# Patient Record
Sex: FEMALE | Race: WHITE | NOT HISPANIC OR LATINO | Employment: OTHER | URBAN - METROPOLITAN AREA
[De-identification: names, ages, dates, MRNs, and addresses within clinical notes are randomized per-mention and may not be internally consistent; named-entity substitution may affect disease eponyms.]

---

## 2017-03-01 ENCOUNTER — HOSPITAL ENCOUNTER (OUTPATIENT)
Dept: RADIOLOGY | Facility: HOSPITAL | Age: 82
Discharge: HOME | End: 2017-03-01
Attending: FAMILY MEDICINE
Payer: MEDICARE

## 2017-03-01 ENCOUNTER — OFFICE VISIT (OUTPATIENT)
Dept: FAMILY MEDICINE | Facility: CLINIC | Age: 82
End: 2017-03-01
Payer: MEDICARE

## 2017-03-01 VITALS
WEIGHT: 122.69 LBS | TEMPERATURE: 98 F | DIASTOLIC BLOOD PRESSURE: 63 MMHG | HEIGHT: 64 IN | BODY MASS INDEX: 20.95 KG/M2 | SYSTOLIC BLOOD PRESSURE: 116 MMHG | HEART RATE: 93 BPM

## 2017-03-01 DIAGNOSIS — M25.50 ARTHRALGIA, UNSPECIFIED JOINT: ICD-10-CM

## 2017-03-01 DIAGNOSIS — N18.30 DIABETES MELLITUS WITH STAGE 3 CHRONIC KIDNEY DISEASE: ICD-10-CM

## 2017-03-01 DIAGNOSIS — E11.22 DIABETES MELLITUS WITH STAGE 3 CHRONIC KIDNEY DISEASE: ICD-10-CM

## 2017-03-01 DIAGNOSIS — I08.0 MITRAL VALVE STENOSIS AND AORTIC VALVE STENOSIS: ICD-10-CM

## 2017-03-01 DIAGNOSIS — R30.0 DYSURIA: ICD-10-CM

## 2017-03-01 DIAGNOSIS — M25.50 ARTHRALGIA, UNSPECIFIED JOINT: Primary | ICD-10-CM

## 2017-03-01 DIAGNOSIS — M48.061 LUMBAR STENOSIS: ICD-10-CM

## 2017-03-01 DIAGNOSIS — R06.09 DOE (DYSPNEA ON EXERTION): ICD-10-CM

## 2017-03-01 DIAGNOSIS — F43.21 ADJUSTMENT DISORDER WITH DEPRESSED MOOD: ICD-10-CM

## 2017-03-01 DIAGNOSIS — R07.9 CHEST PAIN, UNSPECIFIED TYPE: ICD-10-CM

## 2017-03-01 DIAGNOSIS — R53.83 FATIGUE, UNSPECIFIED TYPE: ICD-10-CM

## 2017-03-01 DIAGNOSIS — F03.90 DEMENTIA WITHOUT BEHAVIORAL DISTURBANCE, UNSPECIFIED DEMENTIA TYPE: ICD-10-CM

## 2017-03-01 PROCEDURE — 99999 PR PBB SHADOW E&M-EST. PATIENT-LVL V: CPT | Mod: PBBFAC,,, | Performed by: FAMILY MEDICINE

## 2017-03-01 PROCEDURE — 71020 XR CHEST PA AND LATERAL: CPT | Mod: TC,PO

## 2017-03-01 PROCEDURE — 71020 XR CHEST PA AND LATERAL: CPT | Mod: 26,,, | Performed by: RADIOLOGY

## 2017-03-01 PROCEDURE — 73521 X-RAY EXAM HIPS BI 2 VIEWS: CPT | Mod: 26,,, | Performed by: RADIOLOGY

## 2017-03-01 PROCEDURE — 99214 OFFICE O/P EST MOD 30 MIN: CPT | Mod: S$PBB,,, | Performed by: FAMILY MEDICINE

## 2017-03-01 PROCEDURE — 73521 X-RAY EXAM HIPS BI 2 VIEWS: CPT | Mod: TC,PO

## 2017-03-01 RX ORDER — ACETAMINOPHEN 325 MG/1
650 TABLET ORAL EVERY 6 HOURS PRN
Refills: 0 | COMMUNITY
Start: 2017-03-01 | End: 2017-05-31

## 2017-03-01 RX ORDER — SERTRALINE HYDROCHLORIDE 50 MG/1
TABLET, FILM COATED ORAL
Qty: 30 TABLET | Refills: 5 | Status: SHIPPED | OUTPATIENT
Start: 2017-03-01 | End: 2017-03-14

## 2017-03-01 NOTE — MR AVS SNAPSHOT
[facility]  [address] [facility] [address]  Phone: [phone]  Fax: [phone]                  Renee Gatica   3/1/2017 2:20 PM   Office Visit    Description:  Female : 1923   Provider:  Terry Ayers MD   Department:  [facility]           Reason for Visit     Annual Exam           Diagnoses this Visit        Comments    Arthralgia, unspecified joint    -  Primary     Fatigue, unspecified type         Mitral valve stenosis and aortic valve stenosis         Dementia without behavioral disturbance, unspecified dementia type         Diabetes mellitus with stage 3 chronic kidney disease         Adjustment disorder with depressed mood         Dysuria         Chest pain, unspecified type         MALAGON (dyspnea on exertion)         Lumbar stenosis                To Do List           Future Appointments        Provider Department Dept Phone    3/1/2017  3:45 PM [facility] XR1 [facility] [phone]    3/1/2017 4:00 PM [facility] XR1 [facility] [phone]    3/1/2017 4:20 PM [place]ATORY, [place] [facility] [phone]      Goals (5 Years of Data)     None       These Medications        Disp Refills Start End    sertraline (ZOLOFT) 50 MG tablet 30 tablet 5 3/1/2017     Take 1/2 po daily for 1 week, then take 1 daily    Pharmacy: [facility] [address] [place], [place] [address] Ph #: [phone]         PURCHASE these Medications (No prescription required)        Start End    acetaminophen (TYLENOL) 325 MG tablet 3/1/2017     Sig - Route: Take 2 tablets (650 mg total) by mouth every 6 (six) hours as needed for Pain. - Oral    Class: OTC      [facility] On Call     [facility] On Call Nurse Care Line -  Assistance  Registered nurses in the [facility] On Call Center provide clinical advisement, health education, appointment booking, and other advisory services.  Call for this  free service at [phone].             Medications           Message regarding Medications     Verify the changes and/or additions to your medication regime listed below are the same as discussed with your clinician today.  If any of these changes or additions are incorrect, please notify your healthcare provider.        START taking these NEW medications        Refills    sertraline (ZOLOFT) 50 MG tablet 5    Sig: Take 1/2 po daily for 1 week, then take 1 daily    Class: Normal    acetaminophen (TYLENOL) 325 MG tablet 0    Sig: Take 2 tablets (650 mg total) by mouth every 6 (six) hours as needed for Pain.    Class: OTC    Route: Oral      STOP taking these medications     fluticasone (FLONASE) 50 mcg/actuation nasal spray 1 spray by Each Nare route once daily.           Verify that the below list of medications is an accurate representation of the medications you are currently taking.  If none reported, the list may be blank. If incorrect, please contact your healthcare provider. Carry this list with you in case of emergency.           Current Medications     acetaminophen (TYLENOL) 325 MG tablet Take 2 tablets (650 mg total) by mouth every 6 (six) hours as needed for Pain.    sertraline (ZOLOFT) 50 MG tablet Take 1/2 po daily for 1 week, then take 1 daily           Clinical Reference Information           Your Vitals Were     BP                   116/63           Blood Pressure          Most Recent Value    BP  116/63      Allergies as of 3/1/2017     Ace Inhibitors    Sulfa (Sulfonamide Antibiotics)      Immunizations Administered on Date of Encounter - 3/1/2017     None      Orders Placed During Today's Visit      Normal Orders This Visit    Ambulatory referral to Cardiology     Ambulatory referral to Home Health     Urinalysis     Urine culture     Future Labs/Procedures Expected by Expires    C-reactive protein  3/1/2017 4/30/2018    CBC auto differential  3/1/2017 3/1/2018    Comprehensive metabolic  panel  3/1/2017 3/1/2018    Hemoglobin A1c  3/1/2017 (Approximate) 3/1/2018    Sedimentation rate, manual  3/1/2017 3/1/2018    TSH  3/1/2017 3/2/2018    X-Ray Chest PA And Lateral  3/1/2017 3/1/2018    X-Ray Hips Bilateral 2 View Incl AP Pelvis  3/1/2017 3/1/2018    Urine culture  As directed 3/1/2018      Language Assistance Services     ATTENTION: Language assistance services are available, free of charge. Please call [phone].      ATENCIÓN: Si habla español, tiene a anaya disposición servicios gratuitos de asistencia lingüística. Llame al [phone].     CHÚ Ý: N?u b?n nói Ti?ng Vi?t, có các d?ch v? h? tr? ngôn ng? mi?n phí dành cho b?n. G?i s? [phone].         [facility] complies with applicable Federal civil rights laws and does not discriminate on the basis of race, color, national origin, age, disability, or sex.

## 2017-03-02 DIAGNOSIS — M25.50 ARTHRALGIA, UNSPECIFIED JOINT: Primary | ICD-10-CM

## 2017-03-02 LAB
BACTERIA #/AREA URNS HPF: ABNORMAL /HPF
BILIRUB UR QL STRIP: NEGATIVE
CLARITY UR: CLEAR
COLOR UR: YELLOW
GLUCOSE UR QL STRIP: NEGATIVE
HGB UR QL STRIP: ABNORMAL
KETONES UR QL STRIP: NEGATIVE
LEUKOCYTE ESTERASE UR QL STRIP: ABNORMAL
MICROSCOPIC COMMENT: ABNORMAL
NITRITE UR QL STRIP: POSITIVE
PH UR STRIP: 6 [PH] (ref 5–8)
PROT UR QL STRIP: ABNORMAL
RBC #/AREA URNS HPF: 1 /HPF (ref 0–4)
SP GR UR STRIP: 1.01 (ref 1–1.03)
SQUAMOUS #/AREA URNS HPF: 2 /HPF
URN SPEC COLLECT METH UR: ABNORMAL
WBC #/AREA URNS HPF: 12 /HPF (ref 0–5)

## 2017-03-02 RX ORDER — PREDNISONE 20 MG/1
20 TABLET ORAL DAILY
Qty: 30 TABLET | Refills: 0 | Status: SHIPPED | OUTPATIENT
Start: 2017-03-02 | End: 2017-03-31 | Stop reason: SDUPTHER

## 2017-03-02 RX ORDER — PREDNISONE 20 MG/1
20 TABLET ORAL DAILY
COMMUNITY
End: 2017-03-02 | Stop reason: SDUPTHER

## 2017-03-02 NOTE — TELEPHONE ENCOUNTER
----- Message from Chandrika Camejo sent at 3/2/2017 12:12 PM CST -----  Contact: pepito butterfield//daughter  does pt still need to see cardiologist altho her chest xray came back fine...[phone]

## 2017-03-02 NOTE — PROGRESS NOTES
Patient presents with a complaint of pain in the upper and lower extremities. Seems to be especially at the shoulders knees and thighs. She's had a lot aching. She gets stiff in the morning. She's had some chest pains and some dyspnea on exertion. Followed by [facility]. Symptoms at least for a few months. Poor historian. Seen with her daughter today. She denies any swelling.  She denies any falls. She's having difficulty taking care of her self at home. Very limited mobility. She takes a lot of naps during the day. Possibly some dysuria. Daughter ill. Some depression. No SI/HI.    Past Medical History:  Past Medical History:   Diagnosis Date    Acquired complete AV block 2015    Overview:  Required pacemaker placement.     Chronic kidney disease (CKD), stage III (moderate) 2015    CKD (chronic kidney disease) stage 3, GFR 30-59 ml/min     Dementia 2015    Diabetes mellitus type II     Hypertension     Lumbar stenosis     Mitral valve stenosis and aortic valve stenosis 2015    Overview:  EF 55-60%  Last Assessment & Plan:  Mild. No plans for further workup     Multinodular goiter 2015    Overview:  Has had normal TSH in      Osteopenia 2015    Presence of cardiac pacemaker 2015    Overview:  Placed for complete heart block  Last Assessment & Plan:  The patient's pacemaker site has healed nicely. She's not having any cardiac symptoms continue routine pacemaker checks     Spondylosis of cervical spine 2015    Overview:  C4-C7      Past Surgical History:   Procedure Laterality Date    APPENDECTOMY      CARDIAC PACEMAKER PLACEMENT       SECTION      CHOLECYSTECTOMY      HYSTERECTOMY       Social History     Social History    Marital status: Single     Spouse name: N/A    Number of children: N/A    Years of education: N/A     Occupational History    Not on file.     Social History Main Topics    Smoking status: Never Smoker    Smokeless  "tobacco: Never Used    Alcohol use No    Drug use: No    Sexual activity: Not on file     Other Topics Concern    Not on file     Social History Narrative     Family History   Problem Relation Age of Onset    Diabetes Daughter     Heart disease Brother     Colon cancer Brother     Cancer Father      Review of patient's allergies indicates:   Allergen Reactions    Ace inhibitors     Sulfa (sulfonamide antibiotics)      Current Outpatient Prescriptions on File Prior to Visit   Medication Sig Dispense Refill    [DISCONTINUED] fluticasone (FLONASE) 50 mcg/actuation nasal spray 1 spray by Each Nare route once daily. 16 g 2     No current facility-administered medications on file prior to visit.          ROS:  GENERAL: No fever, chills.   CARDIOVASCULAR: as above  ABDOMEN:. Denies diarrhea, abdominal pain, hematemesis or blood in stool.  URINARY: No flank pain,or hematuria.      OBJECTIVE:     Vitals:    03/01/17 1442   BP: 116/63   Pulse: 93   Temp: 97.8 °F (36.6 °C)   TempSrc: Oral   Weight: 55.7 kg (122 lb 11 oz)   Height: 5' 4" (1.626 m)     Wt Readings from Last 3 Encounters:   03/01/17 55.7 kg (122 lb 11 oz)   05/30/16 58.2 kg (128 lb 4.9 oz)   07/14/15 60.6 kg (133 lb 9.6 oz)     APPEARANCE: Well nourished, well developed, in no acute distress.  In wheelchair  HEAD: Normocephalic.  Atraumatic.  No sinus tenderness.  EYES:   Right eye: Pupil reactive.  Conjunctiva clear.    Left eye: Pupil reactive.  Conjunctiva clear.     EOMI.    EARS: TM's intact. Light reflex normal. No retraction or perforation.    NOSE:  clear.  MOUTH & THROAT:  No pharyngeal erythema or exudate. No lesions.  NECK: Supple. No bruits.  No JVD.  No cervical lymphadenopathy.  No thyromegaly.    CHEST: Breath sounds clear bilaterally.  Normal respiratory effort  CARDIOVASCULAR: Normal rate.  Regular rhythm.  2/6 sys  murmurs.  No rub.  No gallops.  ABDOMEN: Bowel sounds normal.  Soft.  No tenderness.  No organomegaly.  PERIPHERAL " VASCULAR: No cyanosis.  No clubbing.  No edema.  NEUROLOGIC: No focal findings.  MENTAL STATUS: Alert.  Oriented x 3.  Dec ROm shoulders and hips. No swollen jts. No focal tenderness  Protective Sensation (w/ 10 gram monofilament):  Right: Intact  Left: Intact    Visual Inspection:  Normal -  Bilateral    Pedal Pulses:   Right: Present  Left: Present    Posterior tibialis:   Right:Present  Left: Present        Renee was seen today for annual exam.    Diagnoses and all orders for this visit:    Arthralgia, unspecified joint  -     CBC auto differential; Future  -     Sedimentation rate, manual; Future  -     C-reactive protein; Future  -     Cancel: X-Ray Hip 3 or 4 views Bilateral; Future  -     Ambulatory referral to Home Health  -     X-Ray Hips Bilateral 2 View Incl AP Pelvis; Future    Fatigue, unspecified type  -     TSH; Future  -     Ambulatory referral to Home Health    Mitral valve stenosis and aortic valve stenosis  -     Ambulatory referral to Home Health  -     Ambulatory referral to Cardiology    Dementia without behavioral disturbance, unspecified dementia type  -     Ambulatory referral to Home Health    Diabetes mellitus with stage 3 chronic kidney disease  -     Comprehensive metabolic panel; Future  -     Hemoglobin A1c; Future  -     Ambulatory referral to Home Health    Adjustment disorder with depressed mood  -     Ambulatory referral to Home Health    Dysuria  -     Urinalysis  -     Urine culture; Future  -     Ambulatory referral to Home Health  -     Urine culture    Chest pain, unspecified type  -     Ambulatory referral to Cardiology    MALAGON (dyspnea on exertion)  -     X-Ray Chest PA And Lateral; Future  -     Ambulatory referral to Cardiology    Lumbar stenosis    Other orders  -     sertraline (ZOLOFT) 50 MG tablet; Take 1/2 po daily for 1 week, then take 1 daily  -     acetaminophen (TYLENOL) 325 MG tablet; Take 2 tablets (650 mg total) by mouth every 6 (six) hours as needed for  Pain.      F/u 1 month.

## 2017-03-02 NOTE — TELEPHONE ENCOUNTER
Sugar test was good.  Inflammation tests were significantly elevated.  Kidney function decreased, but similar to previous checks.  White blood cell count elevated with mild anemia.  Apparently the white blood cell count has been elevated in the past as well.     Please get serum protein electrophoresis, serum free light chains, random urine protein electrophoresis.  Refer to hematology to review.     Start prednisone 20 mg daily.  Have her come back to see me 1 week after starting prednisone.  Please get the blood test drawn and urine sample prior to starting to take the prednisone

## 2017-03-03 ENCOUNTER — NURSE TRIAGE (OUTPATIENT)
Dept: ADMINISTRATIVE | Facility: CLINIC | Age: 82
End: 2017-03-03

## 2017-03-03 NOTE — TELEPHONE ENCOUNTER
"  Reason for Disposition   [1] SEVERE headache AND [2] sudden-onset (i.e., reaching maximum intensity within seconds)    Answer Assessment - Initial Assessment Questions  1. LOCATION: "Where does it hurt?"       "all over"  2. ONSET: "When did the headache start?" (Minutes, hours or days)       Within last hour  3. PATTERN: "Does the pain come and go, or has it been constant since it started?"      constant  4. SEVERITY: "How bad is the pain?" and "What does it keep you from doing?"  (e.g., Scale 1-10; mild, moderate, or severe)    - MILD (1-3): doesn't interfere with normal activities     - MODERATE (4-7): interferes with normal activities or awakens from sleep     - SEVERE (8-10): excruciating pain, unable to do any normal activities         Pt states "pretty bad"    6. CAUSE: "What do you think is causing the headache?"      Not sure    9. OTHER SYMPTOMS: "Do you have any other symptoms?" (fever, stiff neck, eye pain, sore throat, cold symptoms)      + diarrhea, + nausea and vomited x1/ denies fever/ unsure if photophobic  10. PREGNANCY: "Is there any chance you are pregnant?" "When was your last menstrual period?"        n/a    Protocols used: ST HEADACHE-A-AH  daughter states she had sudden onset of HA/ + nausea and small amount of emesis/ + diarrhea/ denies fever/ started a generic zoloft today about 5 hours ago--daughter not sure if symptoms related/ when asked to rate headache pain pt's reply was "pretty bad"    To ER for evaluation and tx    Jolie Mchugh RN  "

## 2017-03-04 LAB
BACTERIA UR CULT: NORMAL
BACTERIA UR CULT: NORMAL

## 2017-03-06 ENCOUNTER — TELEPHONE (OUTPATIENT)
Dept: FAMILY MEDICINE | Facility: CLINIC | Age: 82
End: 2017-03-06

## 2017-03-06 ENCOUNTER — LAB VISIT (OUTPATIENT)
Dept: LAB | Facility: HOSPITAL | Age: 82
End: 2017-03-06
Attending: FAMILY MEDICINE
Payer: MEDICARE

## 2017-03-06 DIAGNOSIS — M25.50 ARTHRALGIA, UNSPECIFIED JOINT: ICD-10-CM

## 2017-03-06 PROCEDURE — 84165 PROTEIN E-PHORESIS SERUM: CPT | Mod: 26,,, | Performed by: PATHOLOGY

## 2017-03-06 PROCEDURE — 36415 COLL VENOUS BLD VENIPUNCTURE: CPT | Mod: PO

## 2017-03-06 PROCEDURE — 83520 IMMUNOASSAY QUANT NOS NONAB: CPT

## 2017-03-06 PROCEDURE — 86334 IMMUNOFIX E-PHORESIS SERUM: CPT | Mod: 26,,, | Performed by: PATHOLOGY

## 2017-03-06 PROCEDURE — 86334 IMMUNOFIX E-PHORESIS SERUM: CPT

## 2017-03-06 PROCEDURE — 84165 PROTEIN E-PHORESIS SERUM: CPT

## 2017-03-06 NOTE — TELEPHONE ENCOUNTER
Per daughter, HH is evaluating patient as we speak.  Informed her to let us know, after evaluation, if they need anything from us.

## 2017-03-06 NOTE — TELEPHONE ENCOUNTER
----- Message from Obed Eduardo sent at 3/6/2017  9:50 AM CST -----  Contact: Yordan ( pt daughter )   Yordan ( pt daughter ) is requesting a call from nurse to discuss some health concern and questions regarding medication. Yordan ( pt daughter ) [place] and would to request a order for home health aid or a round the clock care.            Please call Yordan ( pt daughter ) back at [phone]

## 2017-03-07 LAB
ALBUMIN SERPL ELPH-MCNC: 3.02 G/DL
ALPHA1 GLOB SERPL ELPH-MCNC: 0.65 G/DL
ALPHA2 GLOB SERPL ELPH-MCNC: 1.03 G/DL
B-GLOBULIN SERPL ELPH-MCNC: 1.04 G/DL
GAMMA GLOB SERPL ELPH-MCNC: 1.85 G/DL
KAPPA LC SER QL IA: 13.11 MG/DL
KAPPA LC/LAMBDA SER IA: 1.45
LAMBDA LC SER QL IA: 9.04 MG/DL
PATHOLOGIST INTERPRETATION SPE: NORMAL
PROT SERPL-MCNC: 7.6 G/DL

## 2017-03-07 NOTE — TELEPHONE ENCOUNTER
----- Message from Jaclyn Rodriguez sent at 3/7/2017  8:18 AM CST -----  Contact: logan/[facility]  Would like [facility] clinic notes faxed to [phone]. Please call back at [phone]. Thanks//cdb

## 2017-03-08 ENCOUNTER — TELEPHONE (OUTPATIENT)
Dept: FAMILY MEDICINE | Facility: CLINIC | Age: 82
End: 2017-03-08

## 2017-03-08 LAB
INTERPRETATION SERPL IFE-IMP: NORMAL
PATHOLOGIST INTERPRETATION IFE: NORMAL

## 2017-03-08 NOTE — TELEPHONE ENCOUNTER
I have been unable to reach this patient. Left message for patient to call back to discuss home health.

## 2017-03-08 NOTE — TELEPHONE ENCOUNTER
Patient's daughter states that her mom cannot use arms and is pain constantly and says that tylenol is not helping. Patient was last seen in office on 03/01/2017and is asking what else can be given. Please advise.

## 2017-03-08 NOTE — TELEPHONE ENCOUNTER
----- Message from Ebonie Tapia sent at 3/8/2017  3:38 PM CST -----  Contact: patient's daughter  Patient's daughter is trying to have her mother moved to an assisted living home. Dr. Ayers suggested this as a solution for the patient.    Dr. Ayers is out of the clinic until next Tuesday and I am unable to book an appointment on his schedule until two week from today's date. Her daughter only has four days left until she returns home.    Her daughter is asking if there is another physician that can help in filling out the needed paperwork and examinations that need to take place in order to have her moved to an assisted living home asa.    Please call her daughter, whom is listed on her involvement in care at [phone] to let her know if this is something that Dr. Ayers must do or if another pcp can complete this process for the patient. Her daughter handling this is Yamile Gonzalez.

## 2017-03-08 NOTE — TELEPHONE ENCOUNTER
Due to her kidney disease, her options are limited. If she cannot move her arms at all, she may need to report to the ER.

## 2017-03-09 NOTE — TELEPHONE ENCOUNTER
Is this something you can assist this patient with or does her PCP have to complete the paperwork, please advise.

## 2017-03-09 NOTE — TELEPHONE ENCOUNTER
Steffany informed tried numerous times, number busy.  Dtdacia Ovalle informed she has to be seen by Dr Ayers, OV scheduled Tuesday, dtr aware.

## 2017-03-09 NOTE — TELEPHONE ENCOUNTER
----- Message from Laurel Mackenzie sent at 3/9/2017 11:48 AM CST -----  Contact: [facility]  [facility] calling regarding family there wanting to admit the pt so they need medication  List,recent chest x-ray and diagnosis list faxed over,[phone] fax number....[phone]

## 2017-03-14 ENCOUNTER — OFFICE VISIT (OUTPATIENT)
Dept: FAMILY MEDICINE | Facility: CLINIC | Age: 82
End: 2017-03-14
Payer: MEDICARE

## 2017-03-14 VITALS
BODY MASS INDEX: 21.15 KG/M2 | WEIGHT: 123.88 LBS | SYSTOLIC BLOOD PRESSURE: 116 MMHG | HEIGHT: 64 IN | TEMPERATURE: 98 F | DIASTOLIC BLOOD PRESSURE: 63 MMHG | HEART RATE: 95 BPM

## 2017-03-14 DIAGNOSIS — D72.829 LEUKOCYTOSIS, UNSPECIFIED TYPE: ICD-10-CM

## 2017-03-14 DIAGNOSIS — M47.812 OSTEOARTHRITIS OF CERVICAL SPINE, UNSPECIFIED SPINAL OSTEOARTHRITIS COMPLICATION STATUS: ICD-10-CM

## 2017-03-14 DIAGNOSIS — E11.22 DIABETES MELLITUS WITH STAGE 3 CHRONIC KIDNEY DISEASE: ICD-10-CM

## 2017-03-14 DIAGNOSIS — M48.061 LUMBAR STENOSIS: ICD-10-CM

## 2017-03-14 DIAGNOSIS — M35.3 PMR (POLYMYALGIA RHEUMATICA): Primary | ICD-10-CM

## 2017-03-14 DIAGNOSIS — M85.80 OSTEOPENIA: ICD-10-CM

## 2017-03-14 DIAGNOSIS — E04.2 MULTINODULAR GOITER: ICD-10-CM

## 2017-03-14 DIAGNOSIS — I08.0 MITRAL VALVE STENOSIS AND AORTIC VALVE STENOSIS: ICD-10-CM

## 2017-03-14 DIAGNOSIS — F03.90 DEMENTIA WITHOUT BEHAVIORAL DISTURBANCE, UNSPECIFIED DEMENTIA TYPE: ICD-10-CM

## 2017-03-14 DIAGNOSIS — N18.30 DIABETES MELLITUS WITH STAGE 3 CHRONIC KIDNEY DISEASE: ICD-10-CM

## 2017-03-14 DIAGNOSIS — I15.2 HYPERTENSION ASSOCIATED WITH DIABETES: ICD-10-CM

## 2017-03-14 DIAGNOSIS — Z95.0 PRESENCE OF CARDIAC PACEMAKER: ICD-10-CM

## 2017-03-14 DIAGNOSIS — E11.59 HYPERTENSION ASSOCIATED WITH DIABETES: ICD-10-CM

## 2017-03-14 PROCEDURE — 99214 OFFICE O/P EST MOD 30 MIN: CPT | Mod: S$PBB,,, | Performed by: FAMILY MEDICINE

## 2017-03-14 PROCEDURE — 99213 OFFICE O/P EST LOW 20 MIN: CPT | Mod: PBBFAC,PO | Performed by: FAMILY MEDICINE

## 2017-03-14 PROCEDURE — 99999 PR PBB SHADOW E&M-EST. PATIENT-LVL III: CPT | Mod: PBBFAC,,, | Performed by: FAMILY MEDICINE

## 2017-03-14 RX ORDER — METOPROLOL SUCCINATE 25 MG/1
25 TABLET, EXTENDED RELEASE ORAL
COMMUNITY
Start: 2017-03-06 | End: 2017-03-14

## 2017-03-14 RX ORDER — HYDROCODONE BITARTRATE AND ACETAMINOPHEN 5; 325 MG/1; MG/1
1 TABLET ORAL
COMMUNITY
Start: 2017-03-08 | End: 2017-03-28

## 2017-03-14 NOTE — MR AVS SNAPSHOT
[facility]  [address] [address] [facility] [address]  Phone: [phone]  Fax: [phone]                  Renee Gatica   3/14/2017 3:00 PM   Office Visit    Description:  Female : 1923   Provider:  Terry Ayers MD   Department:  [facility]           Reason for Visit     Arm Pain     nursing home admit           Diagnoses this Visit        Comments    PMR (polymyalgia rheumatica)    -  Primary     Leukocytosis, unspecified type         Dementia without behavioral disturbance, unspecified dementia type         Hypertension associated with diabetes         Mitral valve stenosis and aortic valve stenosis         Diabetes mellitus with stage 3 chronic kidney disease         Multinodular goiter         Osteopenia         Osteoarthritis of cervical spine, unspecified spinal osteoarthritis complication status         Presence of cardiac pacemaker         Lumbar stenosis                To Do List           Future Appointments        Provider Department Dept Phone    3/28/2017 11:15 AM LABORATORY, [place] [facility] [phone]    2017 9:20 AM Daniel Snow MD [facility] - Hematology Oncology [phone]    2017 10:45 AM [facility], [place] [facility] [phone]      Goals (5 Years of Data)     None      [facility]     [facility] Nurse Care Line -  Assistance  Registered nurses in the [facility] provide clinical advisement, health education, appointment booking, and other advisory services.  Call for this free service at [phone].             Medications           Message regarding Medications     Verify the changes and/or additions to your medication regime listed below are the same as discussed with your clinician today.  If any of these changes or additions are incorrect, please notify your healthcare provider.        STOP taking these medications     sertraline (ZOLOFT) 50 MG  "tablet Take 1/2 po daily for 1 week, then take 1 daily    metoprolol succinate (TOPROL-XL) 25 MG 24 hr tablet Take 25 mg by mouth.           Verify that the below list of medications is an accurate representation of the medications you are currently taking.  If none reported, the list may be blank. If incorrect, please contact your healthcare provider. Carry this list with you in case of emergency.           Current Medications     acetaminophen (TYLENOL) 325 MG tablet Take 2 tablets (650 mg total) by mouth every 6 (six) hours as needed for Pain.    hydrocodone-acetaminophen 5-325mg (NORCO) 5-325 mg per tablet Take 1 tablet by mouth.    predniSONE (DELTASONE) 20 MG tablet Take 1 tablet (20 mg total) by mouth once daily.           Clinical Reference Information           Your Vitals Were     BP Pulse Temp Height Weight BMI    116/63 95 97.6 °F (36.4 °C) 5' 4" (1.626 m) 56.2 kg (123 lb 14.4 oz) 21.27 kg/m2      Blood Pressure          Most Recent Value    BP  116/63      Allergies as of 3/14/2017     Ace Inhibitors    Sulfa (Sulfonamide Antibiotics)      Immunizations Administered on Date of Encounter - 3/14/2017     None      Orders Placed During Today's Visit      Normal Orders This Visit    Ambulatory referral to Hematology / Oncology     Recurring Lab Work Interval Expires    C-reactive protein   3/14/2018    Sedimentation rate, manual   3/14/2018      Language Assistance Services     ATTENTION: Language assistance services are available, free of charge. Please call [phone].      ATENCIÓN: Si habla español, tiene a anaya disposición servicios gratuitos de asistencia lingüística. Llame al [phone].     YUE Ý: N?u b?n nói Ti?ng Vi?t, có các d?ch v? h? tr? ngôn ng? mi?n phí dành cho b?n. G?i s? [phone].         [facility] complies with applicable Federal civil rights laws and does not discriminate on the basis of race, color, national origin, age, disability, or sex.        "

## 2017-03-14 NOTE — PROGRESS NOTES
Patient presents for follow-up laboratory and multiple arthralgias particularly shoulders knees and thighs.  See previous history of present illness as below.  She was found to have significant elevation CRP and sedimentation rate consistent with probable polymyalgia rheumatica and has been started on 20 mg daily prednisone.  Has noted significant improvement with this with regards to being able to get up out of the chair and use her walker.  She did see the cardiologist regarding chest pains with no further evaluation recommended.  They did discuss starting beta blocker which she has not done as yet.  The chest symptoms essentially resolved with starting the prednisone.  She did have findings consistent with some anemia with elevated WBC.  This apparently is persistent.  We did do a serum protein electrophoresis and free light chains.  She had some elevated proteins but no monoclonal gammopathy noted.  The daughter that has been assisting her temporarily from [place] he's having to go back to work in [place].  Patient has been living with another daughter who is having significant health issues of her own currently and is not able to take care of her.  She is having home health.  They're considering going into nursing home as patient has not been able to physically care for herself with regards to fixing meals.  Difficulty getting up out of bed to go to the bathroom, difficulty with her vision and hearing.  Also general medical decline from dementia.    3/1/2017  Patient presents with a complaint of pain in the upper and lower extremities. Seems to be especially at the shoulders knees and thighs. She's had a lot aching. She gets stiff in the morning. She's had some chest pains and some dyspnea on exertion. Followed by [facility]. Symptoms at least for a few months. Poor historian. Seen with her daughter today. She denies any swelling.  She denies any falls. She's having difficulty taking care of  her self at home. Very limited mobility. She takes a lot of naps during the day. Possibly some dysuria. Daughter ill. Some depression. No SI/HI.    Past Medical History:  Past Medical History:   Diagnosis Date    Acquired complete AV block 2015    Overview:  Required pacemaker placement.     Chronic kidney disease (CKD), stage III (moderate) 2015    CKD (chronic kidney disease) stage 3, GFR 30-59 ml/min     Dementia 2015    Diabetes mellitus type II     Hypertension     Lumbar stenosis     Mitral valve stenosis and aortic valve stenosis 2015    Overview:  EF 55-60%  Last Assessment & Plan:  Mild. No plans for further workup     Multinodular goiter 2015    Overview:  Has had normal TSH in      Osteopenia 2015    Presence of cardiac pacemaker 2015    Overview:  Placed for complete heart block  Last Assessment & Plan:  The patient's pacemaker site has healed nicely. She's not having any cardiac symptoms continue routine pacemaker checks     Spondylosis of cervical spine 2015    Overview:  C4-C7      Past Surgical History:   Procedure Laterality Date    APPENDECTOMY      CARDIAC PACEMAKER PLACEMENT       SECTION      CHOLECYSTECTOMY      HYSTERECTOMY       Social History     Social History    Marital status: Single     Spouse name: N/A    Number of children: N/A    Years of education: N/A     Occupational History    Not on file.     Social History Main Topics    Smoking status: Never Smoker    Smokeless tobacco: Never Used    Alcohol use No    Drug use: No    Sexual activity: Not on file     Other Topics Concern    Not on file     Social History Narrative     Family History   Problem Relation Age of Onset    Diabetes Daughter     Heart disease Brother     Colon cancer Brother     Cancer Father      Review of patient's allergies indicates:   Allergen Reactions    Ace inhibitors     Sulfa (sulfonamide antibiotics)      Current Outpatient  "Prescriptions on File Prior to Visit   Medication Sig Dispense Refill    acetaminophen (TYLENOL) 325 MG tablet Take 2 tablets (650 mg total) by mouth every 6 (six) hours as needed for Pain.  0    predniSONE (DELTASONE) 20 MG tablet Take 1 tablet (20 mg total) by mouth once daily. 30 tablet 0    [DISCONTINUED] sertraline (ZOLOFT) 50 MG tablet Take 1/2 po daily for 1 week, then take 1 daily 30 tablet 5     No current facility-administered medications on file prior to visit.          ROS:  GENERAL: No fever, chills.   CARDIOVASCULAR: No chest pain  ABDOMEN:. Denies diarrhea, abdominal pain, hematemesis or blood in stool.  URINARY: No flank pain,or hematuria.      OBJECTIVE:     Vitals:    03/14/17 1505   BP: 116/63   Pulse: 95   Temp: 97.6 °F (36.4 °C)   Weight: 56.2 kg (123 lb 14.4 oz)   Height: 5' 4" (1.626 m)     Wt Readings from Last 3 Encounters:   03/14/17 56.2 kg (123 lb 14.4 oz)   03/01/17 55.7 kg (122 lb 11 oz)   05/30/16 58.2 kg (128 lb 4.9 oz)     APPEARANCE: Well nourished, well developed, in no acute distress.  Using her walker today instead of wheelchair as she was last visit   HEAD: Normocephalic.  Atraumatic.  No sinus tenderness.  EYES:   Right eye: Pupil reactive.  Conjunctiva clear.    Left eye: Pupil reactive.  Conjunctiva clear.     EOMI.    EARS: TM's intact. Light reflex normal. No retraction or perforation.    NOSE:  clear.  MOUTH & THROAT:  No pharyngeal erythema or exudate. No lesions.  NECK: Supple. No bruits.  No JVD.  No cervical lymphadenopathy.  No thyromegaly.    CHEST: Breath sounds clear bilaterally.  Normal respiratory effort  CARDIOVASCULAR: Normal rate.  Regular rhythm.  2-3/6 sys  murmurs.  No rub.  No gallops.  ABDOMEN: Bowel sounds normal.  Soft.  No tenderness.  No organomegaly.  PERIPHERAL VASCULAR: No cyanosis.  No clubbing.  No edema.  NEUROLOGIC: No focal findings.  MENTAL STATUS: Alert.  Oriented x 3.  Dec ROm shoulders and hips. No swollen jts. No focal tenderness  She " is able to get up off the exam table and use her walker to get into a chair without assistance today      Renee was seen today for arm pain and nursing home admit.    Diagnoses and all orders for this visit:    PMR (polymyalgia rheumatica)  -     C-reactive protein; Standing  -     Sedimentation rate, manual; Standing    Leukocytosis, unspecified type  -     Ambulatory referral to Hematology / Oncology    Dementia without behavioral disturbance, unspecified dementia type    Hypertension associated with diabetes    Mitral valve stenosis and aortic valve stenosis    Diabetes mellitus with stage 3 chronic kidney disease    Multinodular goiter    Osteopenia    Osteoarthritis of cervical spine, unspecified spinal osteoarthritis complication status    Presence of cardiac pacemaker    Lumbar stenosis      Repeat sedimentation rate and C-reactive protein in 2 weeks.  Consider decreasing prednisone if improved.  Recheck hemoglobin A1c in 3 months.  We'll have her see hematology regarding persistent elevated white blood cell count.  Nursing home paperwork completed if she decides to move to the nursing home

## 2017-03-17 ENCOUNTER — TELEPHONE (OUTPATIENT)
Dept: FAMILY MEDICINE | Facility: CLINIC | Age: 82
End: 2017-03-17

## 2017-03-17 NOTE — TELEPHONE ENCOUNTER
I want her to get the CRP and sedimentation rate in about 2 weeks and if this is looking okay then we will start tapering the prednisone slowly at that time.  She is to stay on it for now.

## 2017-03-17 NOTE — TELEPHONE ENCOUNTER
Has about 3 weeks left on the Prednisone, daughter said that you  wanted to taper her off the prednisone.

## 2017-03-17 NOTE — TELEPHONE ENCOUNTER
Adv daughter and key home health to have labs done in 2 weeks.     Adv daughter of additional information regarding tapering.

## 2017-03-17 NOTE — TELEPHONE ENCOUNTER
----- Message from Ebonie Douglas sent at 3/17/2017 10:08 AM CDT -----  Contact: Patient daughter, Yamile  Has a question about her mothers Prednisone dosage, please call her back at [phone]. Thank you

## 2017-03-28 RX ORDER — BUTALBITAL, ACETAMINOPHEN AND CAFFEINE 50; 325; 40 MG/1; MG/1; MG/1
1 TABLET ORAL EVERY 6 HOURS PRN
Qty: 20 TABLET | Refills: 0 | Status: SHIPPED | OUTPATIENT
Start: 2017-03-28 | End: 2017-03-29 | Stop reason: SDUPTHER

## 2017-03-28 NOTE — TELEPHONE ENCOUNTER
Will end the prednisone on Friday, asking if you would call in the dose that you want her start.. C/o of headaches, caretaker gave her a 1/2 of hydrocodone that she received from the hospital. Asking what she should take for the headaches.

## 2017-03-28 NOTE — TELEPHONE ENCOUNTER
Headaches are on forehead and sinus area, unable to come for OV, has not heard from HH, will look into and contact her back, Dr Ayers informed.

## 2017-03-28 NOTE — TELEPHONE ENCOUNTER
----- Message from Nova Pang sent at 3/28/2017 12:37 PM CDT -----  Contact: Keptqlmq-PTRVRCEO-[phone]   Would like to consult with the nurse about change in a medication dosage and changes in patient. Pt would like a Rx called in for headaches if possible would like  Rx medication Hydrocodone.   Please call back @ [phone].  Thanks-[facility]

## 2017-03-28 NOTE — TELEPHONE ENCOUNTER
Needs CRP, sedimentation rate and follow-up appointment in order for me to determine whether we are changing the dose on the prednisone.      Where is the headache on her head?

## 2017-03-29 ENCOUNTER — TELEPHONE (OUTPATIENT)
Dept: FAMILY MEDICINE | Facility: CLINIC | Age: 82
End: 2017-03-29

## 2017-03-29 RX ORDER — BUTALBITAL, ACETAMINOPHEN AND CAFFEINE 50; 325; 40 MG/1; MG/1; MG/1
1 TABLET ORAL EVERY 6 HOURS PRN
Qty: 20 TABLET | Refills: 0 | Status: SHIPPED | OUTPATIENT
Start: 2017-03-29 | End: 2017-04-08

## 2017-03-29 NOTE — TELEPHONE ENCOUNTER
HA medication sent to pharmacy this am.   orders, and all pertinent information, faxed to [facility] at [phone]

## 2017-03-31 RX ORDER — PREDNISONE 20 MG/1
20 TABLET ORAL DAILY
Qty: 30 TABLET | Refills: 0 | Status: SHIPPED | OUTPATIENT
Start: 2017-03-31 | End: 2017-05-12 | Stop reason: SDUPTHER

## 2017-03-31 NOTE — TELEPHONE ENCOUNTER
Did not get a refill request on the prednisone before today.  Looks like I got prescription request for something else few days ago.  Prednisone Prescription was sent today .  We need to get the follow-up blood work that was ordered in order to determine appropriate dose adjustments

## 2017-03-31 NOTE — TELEPHONE ENCOUNTER
----- Message from Alberta Rodriguez sent at 3/31/2017  3:52 PM CDT -----  Contact: Yamlie Gonzalez/daughter  Yamile called and stated she requested a refill of Prednisone for her mother 2 days ago. And they have went to the pharmacy and it isn't there.     [facility] [address] [place] [address] - [address] [facility]  [address] [facility] [address]  Phone: [phone] Fax: [phone]    Please call her at [phone].    Thanks,  Alberta

## 2017-04-04 ENCOUNTER — TELEPHONE (OUTPATIENT)
Dept: FAMILY MEDICINE | Facility: CLINIC | Age: 82
End: 2017-04-04

## 2017-04-04 ENCOUNTER — LAB VISIT (OUTPATIENT)
Dept: LAB | Facility: HOSPITAL | Age: 82
End: 2017-04-04
Attending: FAMILY MEDICINE
Payer: MEDICARE

## 2017-04-04 DIAGNOSIS — M35.3 POLYMYALGIA RHEUMATICA: Primary | ICD-10-CM

## 2017-04-04 LAB — CRP SERPL-MCNC: 1.4 MG/L

## 2017-04-04 PROCEDURE — 86140 C-REACTIVE PROTEIN: CPT

## 2017-04-04 NOTE — TELEPHONE ENCOUNTER
Patient admitted to [facility], per Gisselle, they harvey the blood work yesterday, but only a CMP and sed rate and it was brought to [facility] to run.  Informed CRP needed and lab should be brought to [facility], in the future, to be run.

## 2017-04-04 NOTE — TELEPHONE ENCOUNTER
Inflammation test looks a lot better, but not normal.  Recommend continue with current prednisone 20 mg daily.  Recheck CRP, sedimentation rate in 4 weeks.

## 2017-05-04 ENCOUNTER — TELEPHONE (OUTPATIENT)
Dept: ADMINISTRATIVE | Facility: HOSPITAL | Age: 82
End: 2017-05-04

## 2017-05-04 DIAGNOSIS — R70.0 ELEVATED SEDIMENTATION RATE: Primary | ICD-10-CM

## 2017-05-04 NOTE — TELEPHONE ENCOUNTER
Her recent sedimentation rate is increasing again.  Still better than when she started off with the prednisone.  Recommend continue prednisone for now.  Please refer her to [facility] for further evaluation.  Also please have the home health recheck another sedimentation rate and CRP in 1 month.  Have home health bring the labwork through the [facility] so that we can keep her results together, otherwise need to consider using a different home health.

## 2017-05-12 ENCOUNTER — NURSE TRIAGE (OUTPATIENT)
Dept: ADMINISTRATIVE | Facility: CLINIC | Age: 82
End: 2017-05-12

## 2017-05-12 RX ORDER — PREDNISONE 20 MG/1
TABLET ORAL
Qty: 30 TABLET | Refills: 0 | Status: SHIPPED | OUTPATIENT
Start: 2017-05-12 | End: 2017-05-31

## 2017-05-12 NOTE — TELEPHONE ENCOUNTER
"  Reason for Disposition   Caller has medication question, adult has minor symptoms, caller declines triage, and triager answers question    Protocols used: ST MEDICATION QUESTION CALL-A-AH  daughter calls about refills on meds/ as I check med card and review with caller it is discovered that pt is not on meds that caller is req to be refilled/ caller is not w/ pt & is not the caregiver/ sibling takes care of pt    At end of conversation caller states pt has been "threatening to kill herself".    Advised caller that if pt is threatening to kill herself she needs to go to ER for evaluation/ suicide threats cannot be taken lightly    Caller states she will let her sibling know  Call back for any other concerns or questions    Jolie Mchugh RN  "

## 2017-05-15 ENCOUNTER — TELEPHONE (OUTPATIENT)
Dept: FAMILY MEDICINE | Facility: CLINIC | Age: 82
End: 2017-05-15

## 2017-05-15 NOTE — TELEPHONE ENCOUNTER
Daughter Yamile advised of previous call, she said patient does not have any appointments scheduled as of now.  Informed to call Dr Carson, phone number given, to schedule appt and if unable to get in, call for appt here, verbalized understanding.

## 2017-05-15 NOTE — TELEPHONE ENCOUNTER
Spoke with Candi, with [facility], asked to have someone go to home today to check what medications are being given to patient, and check on follow up  appointment with Dr Carson, or have her schedule here.

## 2017-05-15 NOTE — TELEPHONE ENCOUNTER
----- Message from Estrella Swanson sent at 5/15/2017  8:56 AM CDT -----  Pt daughter(Yamile) at [phone]//states she is returning your call//please call again//thanks//dallas

## 2017-05-15 NOTE — TELEPHONE ENCOUNTER
"Reason for Disposition   Caller has medication question, adult has minor symptoms, caller declines triage, and triager answers question    Protocols used: ST MEDICATION QUESTION CALL-A-AH  daughter calls about refills on meds/ as I check med card and review with caller it is discovered that pt is not on meds that caller is req to be refilled/ caller is not w/ pt & is not the caregiver/ sibling takes care of pt     At end of conversation caller states pt has been "threatening to kill herself".   Advised caller that if pt is threatening to kill herself she needs to go to ER for evaluation/ suicide threats cannot be taken lightly     Caller states she will let her sibling know  Call back for any other concerns or questions     Jolie Mchugh RN    Spoke to daughter Yamile, states caregiver found old zoloft and metoprolol and stated giving it to patient.  She got diarrhea and vomiting from it, they were informed to discard medications.  Yamile reports they said she is better now.  Dr Ayers informed, needs to make sure she has appointment with Dr Carson set up,or have her come here.  Called home, Patient answered and kept saying help me, please help me, I need to go to the bathroom, she reports no one is there to help her.  I called daughter Melisa, they state that "we never left her", they do not know if she has an appointment with Dr Carson, that Yamile takes care of all her appointments.  Called Yamile back, left message on voice mail to return call.  "

## 2017-05-17 RX ORDER — BUTALBITAL, ACETAMINOPHEN AND CAFFEINE 50; 325; 40 MG/1; MG/1; MG/1
1 TABLET ORAL EVERY 6 HOURS PRN
Qty: 20 TABLET | Refills: 0 | Status: SHIPPED | OUTPATIENT
Start: 2017-05-17 | End: 2017-05-31

## 2017-05-17 RX ORDER — BUTALBITAL, ACETAMINOPHEN AND CAFFEINE 50; 325; 40 MG/1; MG/1; MG/1
1 TABLET ORAL EVERY 6 HOURS PRN
COMMUNITY
End: 2017-05-17 | Stop reason: SDUPTHER

## 2017-05-31 ENCOUNTER — OFFICE VISIT (OUTPATIENT)
Dept: FAMILY MEDICINE | Facility: CLINIC | Age: 82
End: 2017-05-31
Payer: MEDICARE

## 2017-05-31 ENCOUNTER — LAB VISIT (OUTPATIENT)
Dept: LAB | Facility: HOSPITAL | Age: 82
End: 2017-05-31
Attending: FAMILY MEDICINE
Payer: MEDICARE

## 2017-05-31 VITALS
HEART RATE: 108 BPM | SYSTOLIC BLOOD PRESSURE: 110 MMHG | HEIGHT: 64 IN | BODY MASS INDEX: 20.32 KG/M2 | WEIGHT: 119.06 LBS | DIASTOLIC BLOOD PRESSURE: 71 MMHG

## 2017-05-31 DIAGNOSIS — N18.30 DIABETES MELLITUS WITH STAGE 3 CHRONIC KIDNEY DISEASE: ICD-10-CM

## 2017-05-31 DIAGNOSIS — E11.59 HYPERTENSION ASSOCIATED WITH DIABETES: ICD-10-CM

## 2017-05-31 DIAGNOSIS — I15.2 HYPERTENSION ASSOCIATED WITH DIABETES: ICD-10-CM

## 2017-05-31 DIAGNOSIS — M35.3 PMR (POLYMYALGIA RHEUMATICA): Primary | ICD-10-CM

## 2017-05-31 DIAGNOSIS — E11.22 DIABETES MELLITUS WITH STAGE 3 CHRONIC KIDNEY DISEASE: ICD-10-CM

## 2017-05-31 DIAGNOSIS — M35.3 PMR (POLYMYALGIA RHEUMATICA): ICD-10-CM

## 2017-05-31 DIAGNOSIS — I08.0 MITRAL VALVE STENOSIS AND AORTIC VALVE STENOSIS: ICD-10-CM

## 2017-05-31 LAB
ALBUMIN SERPL BCP-MCNC: 3.3 G/DL
ALP SERPL-CCNC: 46 U/L
ALT SERPL W/O P-5'-P-CCNC: 39 U/L
ANION GAP SERPL CALC-SCNC: 10 MMOL/L
AST SERPL-CCNC: 19 U/L
BASOPHILS # BLD AUTO: ABNORMAL K/UL
BASOPHILS NFR BLD: 0 %
BILIRUB SERPL-MCNC: 0.5 MG/DL
BUN SERPL-MCNC: 33 MG/DL
CALCIUM SERPL-MCNC: 9.8 MG/DL
CHLORIDE SERPL-SCNC: 105 MMOL/L
CO2 SERPL-SCNC: 26 MMOL/L
CREAT SERPL-MCNC: 1.1 MG/DL
DIFFERENTIAL METHOD: ABNORMAL
EOSINOPHIL # BLD AUTO: ABNORMAL K/UL
EOSINOPHIL NFR BLD: 0 %
ERYTHROCYTE [DISTWIDTH] IN BLOOD BY AUTOMATED COUNT: 16.9 %
EST. GFR  (AFRICAN AMERICAN): 49.7 ML/MIN/1.73 M^2
EST. GFR  (NON AFRICAN AMERICAN): 43.1 ML/MIN/1.73 M^2
GLUCOSE SERPL-MCNC: 147 MG/DL
HCT VFR BLD AUTO: 36.5 %
HGB BLD-MCNC: 12 G/DL
LYMPHOCYTES # BLD AUTO: ABNORMAL K/UL
LYMPHOCYTES NFR BLD: 5 %
MCH RBC QN AUTO: 32.7 PG
MCHC RBC AUTO-ENTMCNC: 32.9 %
MCV RBC AUTO: 100 FL
MONOCYTES # BLD AUTO: ABNORMAL K/UL
MONOCYTES NFR BLD: 6 %
NEUTROPHILS NFR BLD: 88 %
NEUTS BAND NFR BLD MANUAL: 1 %
PLATELET # BLD AUTO: 349 K/UL
PMV BLD AUTO: 11.2 FL
POTASSIUM SERPL-SCNC: 4.1 MMOL/L
PROT SERPL-MCNC: 6.9 G/DL
RBC # BLD AUTO: 3.67 M/UL
SODIUM SERPL-SCNC: 141 MMOL/L
WBC # BLD AUTO: 25.82 K/UL

## 2017-05-31 PROCEDURE — 99214 OFFICE O/P EST MOD 30 MIN: CPT | Mod: S$PBB,,, | Performed by: FAMILY MEDICINE

## 2017-05-31 PROCEDURE — 99999 PR PBB SHADOW E&M-EST. PATIENT-LVL II: CPT | Mod: PBBFAC,,, | Performed by: FAMILY MEDICINE

## 2017-05-31 PROCEDURE — 36415 COLL VENOUS BLD VENIPUNCTURE: CPT | Mod: PO

## 2017-05-31 PROCEDURE — 85007 BL SMEAR W/DIFF WBC COUNT: CPT

## 2017-05-31 PROCEDURE — 85027 COMPLETE CBC AUTOMATED: CPT

## 2017-05-31 PROCEDURE — 80053 COMPREHEN METABOLIC PANEL: CPT

## 2017-05-31 RX ORDER — METOPROLOL SUCCINATE 25 MG/1
25 TABLET, EXTENDED RELEASE ORAL DAILY
Refills: 3 | COMMUNITY
Start: 2017-04-25 | End: 2017-07-12

## 2017-05-31 NOTE — PROGRESS NOTES
Patient presents for follow-up of polymyalgia rheumatica.  She stopped taking the prednisone on her own a few weeks ago.  Caregiver states that it was making her nervous and giving her headache.  She's felt well since then.  She's not having a lot of shoulder or hip pain.  She is able to get up and down from a chair without significant difficulty.  She denies any current headache or jaw claudication.  She denies chest pain or shortness of breath.  She does have chronic kidney disease associated with type 2 diabetes which has been stable.  She did have some anemia associated with the PMR asymptomatic.    Past Medical History:  Past Medical History:   Diagnosis Date    Acquired complete AV block 2015    Overview:  Required pacemaker placement.     Chronic kidney disease (CKD), stage III (moderate) 2015    CKD (chronic kidney disease) stage 3, GFR 30-59 ml/min     Dementia 2015    Diabetes mellitus type II     Hypertension     Lumbar stenosis     Mitral valve stenosis and aortic valve stenosis 2015    Overview:  EF 55-60%  Last Assessment & Plan:  Mild. No plans for further workup     Multinodular goiter 2015    Overview:  Has had normal TSH in      Osteopenia 2015    Presence of cardiac pacemaker 2015    Overview:  Placed for complete heart block  Last Assessment & Plan:  The patient's pacemaker site has healed nicely. She's not having any cardiac symptoms continue routine pacemaker checks     Spondylosis of cervical spine 2015    Overview:  C4-C7      Past Surgical History:   Procedure Laterality Date    APPENDECTOMY      CARDIAC PACEMAKER PLACEMENT       SECTION      CHOLECYSTECTOMY      HYSTERECTOMY       Social History     Social History    Marital status: Single     Spouse name: N/A    Number of children: N/A    Years of education: N/A     Occupational History    Not on file.     Social History Main Topics    Smoking status: Never Smoker  "   Smokeless tobacco: Never Used    Alcohol use No    Drug use: No    Sexual activity: Not on file     Other Topics Concern    Not on file     Social History Narrative    No narrative on file     Family History   Problem Relation Age of Onset    Diabetes Daughter     Heart disease Brother     Colon cancer Brother     Cancer Father      Review of patient's allergies indicates:   Allergen Reactions    Ace inhibitors     Sulfa (sulfonamide antibiotics)      Current Outpatient Prescriptions on File Prior to Visit   Medication Sig Dispense Refill    [DISCONTINUED] acetaminophen (TYLENOL) 325 MG tablet Take 2 tablets (650 mg total) by mouth every 6 (six) hours as needed for Pain.  0    [DISCONTINUED] butalbital-acetaminophen-caffeine -40 mg (FIORICET, ESGIC) -40 mg per tablet Take 1 tablet by mouth every 6 (six) hours as needed for Pain. 20 tablet 0    [DISCONTINUED] predniSONE (DELTASONE) 20 MG tablet TAKE 1 TABLET(20 MG) BY MOUTH EVERY DAY 30 tablet 0     No current facility-administered medications on file prior to visit.            ROS:  GENERAL: No fever, chills,  or significant weight changes.   CARDIOVASCULAR: Denies chest pain, PND, orthopnea .  ABDOMEN: Appetite fine. Denies diarrhea, abdominal pain, hematemesis or blood in stool.  URINARY: No flank pain, dysuria or hematuria.      OBJECTIVE:     Vitals:    05/31/17 1302   BP: 110/71   Pulse: 108   Weight: 54 kg (119 lb 0.8 oz)   Height: 5' 4" (1.626 m)     Wt Readings from Last 3 Encounters:   05/31/17 54 kg (119 lb 0.8 oz)   03/14/17 56.2 kg (123 lb 14.4 oz)   03/01/17 55.7 kg (122 lb 11 oz)     APPEARANCE: Well nourished, well developed, in no acute distress.    HEAD: Normocephalic.  Atraumatic.  No sinus tenderness.  EYES:   Right eye: Pupil reactive.  Conjunctiva clear.    Left eye: Pupil reactive.  Conjunctiva clear.    . EOMI.    EARS: TM's intact. Light reflex normal. No retraction or perforation.    NOSE:  clear.  MOUTH & " THROAT:  No pharyngeal erythema or exudate. No lesions.  NECK: Supple. No JVD.  No cervical lymphadenopathy.  No thyromegaly.    CHEST: Breath sounds clear bilaterally.  Normal respiratory effort  CARDIOVASCULAR: Normal rate.  Regular rhythm.  2/6 systolic murmurs.  No rub.  No gallops.  ABDOMEN: Bowel sounds normal.  Soft.  No tenderness.  No organomegaly.  PERIPHERAL VASCULAR: No cyanosis.  No clubbing.  No edema.  NEUROLOGIC: No focal findings.  She is able to get up from a chair and walk without significant difficulty she is able to climb on the exam table without difficulty  Both shoulders with good range of motion without tenderness  MENTAL STATUS: Alert.  Oriented x 3.    Diagnoses and all orders for this visit:    PMR (polymyalgia rheumatica)  -     CBC auto differential; Future    Diabetes mellitus with stage 3 chronic kidney disease  -     Comprehensive metabolic panel; Future    Hypertension associated with diabetes    Mitral valve stenosis and aortic valve stenosis      Patient discontinued prednisone on her own.  Advised if she gets recurrent symptoms to follow-up again with this.  Otherwise plan to hold off on the prednisone unless recurrent symptoms.  Check CMP CBC hemoglobin A1c at the end of August.

## 2017-06-02 ENCOUNTER — LAB VISIT (OUTPATIENT)
Dept: LAB | Facility: HOSPITAL | Age: 82
End: 2017-06-02
Attending: FAMILY MEDICINE
Payer: MEDICARE

## 2017-06-02 DIAGNOSIS — M35.3 POLYMYALGIA RHEUMATICA: Primary | ICD-10-CM

## 2017-06-02 LAB
CRP SERPL-MCNC: 7.1 MG/L
ERYTHROCYTE [SEDIMENTATION RATE] IN BLOOD BY WESTERGREN METHOD: 23 MM/HR

## 2017-06-02 PROCEDURE — 86140 C-REACTIVE PROTEIN: CPT

## 2017-06-02 PROCEDURE — 85651 RBC SED RATE NONAUTOMATED: CPT | Mod: PO

## 2017-06-14 ENCOUNTER — TELEPHONE (OUTPATIENT)
Dept: HEMATOLOGY/ONCOLOGY | Facility: CLINIC | Age: 82
End: 2017-06-14

## 2017-06-14 ENCOUNTER — OFFICE VISIT (OUTPATIENT)
Dept: HEMATOLOGY/ONCOLOGY | Facility: CLINIC | Age: 82
End: 2017-06-14
Payer: MEDICARE

## 2017-06-14 VITALS
BODY MASS INDEX: 20.63 KG/M2 | TEMPERATURE: 98 F | HEART RATE: 106 BPM | WEIGHT: 120.81 LBS | SYSTOLIC BLOOD PRESSURE: 115 MMHG | OXYGEN SATURATION: 95 % | HEIGHT: 64 IN | DIASTOLIC BLOOD PRESSURE: 67 MMHG

## 2017-06-14 DIAGNOSIS — D72.829 LEUKOCYTOSIS, UNSPECIFIED TYPE: Primary | ICD-10-CM

## 2017-06-14 PROCEDURE — 1159F MED LIST DOCD IN RCRD: CPT | Mod: ,,, | Performed by: INTERNAL MEDICINE

## 2017-06-14 PROCEDURE — 99205 OFFICE O/P NEW HI 60 MIN: CPT | Mod: S$PBB,,, | Performed by: INTERNAL MEDICINE

## 2017-06-14 PROCEDURE — 99999 PR PBB SHADOW E&M-EST. PATIENT-LVL III: CPT | Mod: PBBFAC,,, | Performed by: INTERNAL MEDICINE

## 2017-06-14 PROCEDURE — 99213 OFFICE O/P EST LOW 20 MIN: CPT | Mod: PBBFAC,PO | Performed by: INTERNAL MEDICINE

## 2017-06-14 PROCEDURE — 1126F AMNT PAIN NOTED NONE PRSNT: CPT | Mod: ,,, | Performed by: INTERNAL MEDICINE

## 2017-06-14 RX ORDER — PREDNISONE 20 MG/1
TABLET ORAL
Qty: 30 TABLET | Refills: 0 | OUTPATIENT
Start: 2017-06-14

## 2017-06-14 NOTE — PROGRESS NOTES
OUTPATIENT    MAIN COMPLAINT:  We are asked by Dr. Terry Ayers to evaluate this 89-year-old    lady in regards to her leukocytosis.    HISTORY OF PRESENT ILLNESS:  This is a 89-year-old lady who has dementia.  She   comes today accompanied by her caretaker.  Her insight into the patient's   medical issues is limited.    The patient had a CBC done on 2017 that was reported as showing a   hemoglobin of 12 with an MCV of 100.  White blood cell count was 25,820 with 88%   granulocytes.  The patient has been on prednisone for at least four months for   a diagnosis of polymyalgia rheumatica.  It is noticed by going through the chart   and accessing the care elsewhere section of [employer] that she has had elevated   white cell count off and on between 10,000 and 15,000 for the last several   years.    ALLERGIES:  ACE inhibitors and sulfa.    MEDICATIONS:  See MedCard.    PAST MEDICAL HISTORY:  1.  Chronic kidney disease.  2.  Dementia.  3.  Diabetes mellitus type 2.  4.  Hypertension.  5.  Polymyalgia rheumatica.  6.  Mitral valve stenosis.  7.  Multinodular goiter.  8.  Osteopenia.    PAST SURGICAL HISTORY:  1.  Appendectomy, cardiac pacemaker placement.  2.   section.  3.  Cholecystectomy.  4.  Hysterectomy.    SOCIAL HISTORY:  She lives by herself with a care sitter who looks after her   during the day and the son-in-law during the evening.    She does not smoke or drink.    FAMILY DISEASES:  Unable to obtain.    REVIEW OF SYSTEMS:  Limited because of the lack of knowledge of the patient's   caretaker.  I told that she has a history of polymyalgia rheumatica, generalized   pain and dementia.    PHYSICAL EXAMINATION:  GENERAL:  The patient keeps sitting down with her eyes closed saying that she   fell back.  She did not interact with me.  VITAL SIGNS:  Blood pressure 115/67, pulse 106, temperature 97.6, oxygen   saturation 95%, weight 54.8 kilos.  EYES:  No jaundice or paleness.  OROPHARYNX:  No  ulcers.  No exudates.  ENDOCRINE:  No palpable thyroid.  LYMPHATICS:  No cervical or supraclavicular adenopathy.  NECK:  No jugular venous distension or masses.  LUNGS:  Clear and well ventilated without rales, wheezes, or rhonchi.  CARDIOVASCULAR:  The heart was rhythmic without murmurs or gallops.  ABDOMEN:  Soft.  No obvious hepatosplenomegaly, guarding or rebound.  EXTREMITIES:  No edema.  Good dorsalis pedis and posterior tibialis pulses.  SKIN:  No rashes or ecchymosis.  NEUROLOGIC:  Coordination, strength and gait were normal.  PSYCHIATRIC:  Unable to assess.  The patient has a diagnosis of dementia.    DISCUSSION:  This patient has moderate leukocytosis with 80% granulocytes.    Prednisone can cause elevation of the white cell count and at the expense of the   neutrophil.  However, she has had white cell count elevations before she was   placed on prednisone.  She will have blood drawn today for flow cytometry,   BCR-ABL gene rearrangement and a repeat CBC.  We will discuss the case with Dr. Terry Ayers.  It is noted that the patient has a sacral decubitus.  I will   have the caretaker discuss with her home health agency the local care for the   wound.  In the meantime, they will apply Desitin.  See me in two weeks.                RDF/PN  dd: 06/14/2017 15:26:43 (CDT)  td: 06/14/2017 16:10:52 (CDT)  Doc ID   #[id number]  Job ID #[id number]    CC:

## 2017-06-14 NOTE — PROGRESS NOTES
"OUTPATIENT    MAIN COMPLAINT:  We are asked by Dr. Terry Ayers to evaluate this 89-year-old    lady in regards to her leukocytosis.    HISTORY OF PRESENT ILLNESS:  This is a 89-year-old lady who has dementia.  She   comes today accompanied by her caretaker. The caretaker;s insight into the patient's   medical issues is limited.    The patient had a CBC done on 2017 that was reported as showing a   hemoglobin of 12 with an MCV of 100.  White blood cell count was 25,820 with 88%   granulocytes.  The patient has been on prednisone for at least four months for   a diagnosis of polymyalgia rheumatica.  It is noticed by going through the chart   and accessing the "Care Elsewhere" section of EPIC that she has had elevated   white cell count off and on between 10,000 and 15,000 for the last several   years.    ALLERGIES:  ACE inhibitors and sulfa.    MEDICATIONS:  See MedCard.    PAST MEDICAL HISTORY:  1.  Chronic kidney disease.  2.  Dementia.  3.  Diabetes mellitus type 2.  4.  Hypertension.  5.  Polymyalgia rheumatica.  6.  Mitral valve stenosis.  7.  Multinodular goiter.  8.  Osteopenia.    PAST SURGICAL HISTORY:  1.  Appendectomy, cardiac pacemaker placement.  2.   section.  3.  Cholecystectomy.  4.  Hysterectomy.    SOCIAL HISTORY:  She lives by herself with a care sitter who looks after her   during the day and the son-in-law during the evening.    She does not smoke or drink.    FAMILY DISEASES:  Unable to obtain.    REVIEW OF SYSTEMS:  Limited because of the lack of knowledge of the patient's   caretaker.  I told that she has a history of polymyalgia rheumatica, generalized   pain and dementia.    PHYSICAL EXAMINATION:  GENERAL:  The patient keeps sitting down with her eyes closed saying that she   fell back.  She did not interact with me.  VITAL SIGNS:  Blood pressure 115/67, pulse 106, temperature 97.6, oxygen   saturation 95%, weight 54.8 kilos.  EYES:  No jaundice or " paleness.  OROPHARYNX:  No ulcers.  No exudates.  ENDOCRINE:  No palpable thyroid.  LYMPHATICS:  No cervical or supraclavicular adenopathy.  NECK:  No jugular venous distension or masses.  LUNGS:  Clear and well ventilated without rales, wheezes, or rhonchi.  CARDIOVASCULAR:  The heart was rhythmic without murmurs or gallops.  ABDOMEN:  Soft.  No obvious hepatosplenomegaly, guarding or rebound.  EXTREMITIES:  No edema.  Good dorsalis pedis and posterior tibialis pulses.  SKIN:  No rashes or ecchymosis.  NEUROLOGIC:  Coordination, strength and gait were normal.  PSYCHIATRIC:  Unable to assess.  The patient has a diagnosis of dementia.    DISCUSSION:  This patient has moderate leukocytosis with 80% granulocytes.    Prednisone can cause elevation of the white cell count and at the expense of the   neutrophils.  However, she has had white cell count elevations before she was   placed on prednisone.  She will have blood drawn today for flow cytometry,   BCR-ABL gene rearrangement and a repeat CBC.  We will discuss the case with Dr. Terry Ayers.  It is noted that the patient has a sacral decubitus.  I will   have the caretaker discuss with her home health agency the local care for the   wound.  In the meantime, they will apply Desitin.  See me in two weeks.                DANIAL/TAMIKO  dd: 06/14/2017 15:26:43 (CDT)  td: 06/14/2017 16:10:52 (CDT)  Doc ID   #[id number]  Job ID #[id number]    CC:

## 2017-06-14 NOTE — TELEPHONE ENCOUNTER
----- Message from Jaclyn Reynolds sent at 6/13/2017  2:16 PM CDT -----  Contact: pt daughter Delilah  Patient daughter states she received a call from the office wanting to know if the patient can come in at 1 pm. States yes patient can come in at that time. Please adv/call [phone].//thanks. cw

## 2017-06-14 NOTE — LETTER
June 14, 2017      Terry Ayers MD  [address] [facility] [address]           [place] [facility] H[facility]ology Oncology  [address] [facility] [address]  Phone: [phone]          Patient: Renee Gatica   MR Number: [id number]   YOB: 1923   Date of Visit: 6/14/2017       Dear Dr. Terry Ayers:    Thank you for referring Renee Gatica to me for evaluation. Attached you will find relevant portions of my assessment and plan of care.    If you have questions, please do not hesitate to call me. I look forward to following Renee Gatica along with you.    Sincerely,    Daniel Snow MD    Enclosure  CC:  No Recipients    If you would like to receive this communication electronically, please contact [email or web address] or [phone] to request more information on [facility] access.    For providers and/or their staff who would like to refer a patient to [facility], please contact us through our one-stop-shop provider referral line, [facility], at [phone].    If you feel you have received this communication in error or would no longer like to receive these types of communications, please e-mail [email or web address]

## 2017-06-14 NOTE — TELEPHONE ENCOUNTER
Patient see by Dr Snow today, diarrhea and vomiting while in office. Dr Snow requested Dr Ayers to see her but they did not want to wait and left.  Per Dr Ayers, find out if patient on steroid still.  Called number, no answer

## 2017-06-15 ENCOUNTER — TELEPHONE (OUTPATIENT)
Dept: HEMATOLOGY/ONCOLOGY | Facility: CLINIC | Age: 82
End: 2017-06-15

## 2017-06-15 ENCOUNTER — TELEPHONE (OUTPATIENT)
Dept: FAMILY MEDICINE | Facility: CLINIC | Age: 82
End: 2017-06-15

## 2017-06-15 DIAGNOSIS — M35.3 POLYMYALGIA RHEUMATICA: Primary | ICD-10-CM

## 2017-06-15 RX ORDER — PREDNISONE 5 MG/1
15 TABLET ORAL DAILY
Qty: 90 TABLET | Refills: 0 | Status: SHIPPED | OUTPATIENT
Start: 2017-06-15 | End: 2017-07-25 | Stop reason: SDUPTHER

## 2017-06-15 NOTE — TELEPHONE ENCOUNTER
"Called home number, patient answered but did not know about her medications, she states "I don't know where I am or who I am"  Asked if her family or caregivers were there and she replied" the boy is here but doesn't know anything" and she hung up.  I called daughter Yamile, she does not know if patient is on the steroids still but will contact the caregivers to find out and will call back.  "

## 2017-06-15 NOTE — TELEPHONE ENCOUNTER
----- Message from Claudia Penn sent at 6/15/2017  2:33 PM CDT -----  Contact: Yamile Gonzalez/daughter  Pt lost her rx for Prednisone. Pt last took in on Monday, 06/13/17. Pt needs to have a new rx sent to..    [email or web address] [address] [place] [address] [facility] [address]  [facility]  [place] [address]  Phone: [phone] Fax: [phone]     Yamile can be reached at [phone].

## 2017-06-16 NOTE — TELEPHONE ENCOUNTER
Daughter pepito informed, had questions about patient's medications, advised to have caregiver bring in bottles of any mediations patient is taking so we can review and verify as only metoprolol and steroids are showing on her profile.

## 2017-06-19 ENCOUNTER — TELEPHONE (OUTPATIENT)
Dept: HEMATOLOGY/ONCOLOGY | Facility: CLINIC | Age: 82
End: 2017-06-19

## 2017-06-19 NOTE — TELEPHONE ENCOUNTER
----- Message from Daniel Snow MD sent at 6/17/2017 10:00 AM CDT -----  Please call transcriptions and have them type my dictation from 6/14/2017  Thanks  DR SNOW

## 2017-06-26 ENCOUNTER — TELEPHONE (OUTPATIENT)
Dept: HEMATOLOGY/ONCOLOGY | Facility: CLINIC | Age: 82
End: 2017-06-26

## 2017-06-26 DIAGNOSIS — D72.829 LEUKOCYTOSIS, UNSPECIFIED TYPE: Primary | ICD-10-CM

## 2017-06-26 NOTE — TELEPHONE ENCOUNTER
----- Message from Maggie Hills sent at 6/26/2017  1:51 PM CDT -----  Contact: pepito/daughter [phone]  States that she is returning call please call back at [phone]//thank you acc

## 2017-06-26 NOTE — TELEPHONE ENCOUNTER
----- Message from Chandrika Camejo sent at 6/26/2017 12:33 PM CDT -----  Contact: ms beyer-daughter  does dr need lab results at 6/28 appt...[phone]

## 2017-06-28 ENCOUNTER — LAB VISIT (OUTPATIENT)
Dept: LAB | Facility: HOSPITAL | Age: 82
End: 2017-06-28
Attending: INTERNAL MEDICINE
Payer: MEDICARE

## 2017-06-28 DIAGNOSIS — D72.829 LEUKOCYTOSIS, UNSPECIFIED TYPE: ICD-10-CM

## 2017-06-28 LAB
ANISOCYTOSIS BLD QL SMEAR: SLIGHT
BASOPHILS # BLD AUTO: 0.03 K/UL
BASOPHILS NFR BLD: 0.2 %
DACRYOCYTES BLD QL SMEAR: ABNORMAL
DIFFERENTIAL METHOD: ABNORMAL
EOSINOPHIL # BLD AUTO: 0 K/UL
EOSINOPHIL NFR BLD: 0 %
ERYTHROCYTE [DISTWIDTH] IN BLOOD BY AUTOMATED COUNT: 16.3 %
HCT VFR BLD AUTO: 33.2 %
HGB BLD-MCNC: 11 G/DL
LYMPHOCYTES # BLD AUTO: 0.8 K/UL
LYMPHOCYTES NFR BLD: 4.1 %
MCH RBC QN AUTO: 32.9 PG
MCHC RBC AUTO-ENTMCNC: 33.1 %
MCV RBC AUTO: 99 FL
MONOCYTES # BLD AUTO: 0.9 K/UL
MONOCYTES NFR BLD: 4.8 %
NEUTROPHILS # BLD AUTO: 17.4 K/UL
NEUTROPHILS NFR BLD: 90.9 %
PLATELET # BLD AUTO: 332 K/UL
PLATELET BLD QL SMEAR: ABNORMAL
PMV BLD AUTO: 9.8 FL
POIKILOCYTOSIS BLD QL SMEAR: SLIGHT
RBC # BLD AUTO: 3.34 M/UL
WBC # BLD AUTO: 19.17 K/UL

## 2017-06-28 PROCEDURE — 85025 COMPLETE CBC W/AUTO DIFF WBC: CPT | Mod: PO

## 2017-06-28 PROCEDURE — 36415 COLL VENOUS BLD VENIPUNCTURE: CPT | Mod: PO

## 2017-06-28 PROCEDURE — 88275 CYTOGENETICS 100-300: CPT

## 2017-07-12 ENCOUNTER — OFFICE VISIT (OUTPATIENT)
Dept: HEMATOLOGY/ONCOLOGY | Facility: CLINIC | Age: 82
End: 2017-07-12
Payer: MEDICARE

## 2017-07-12 ENCOUNTER — LAB VISIT (OUTPATIENT)
Dept: LAB | Facility: HOSPITAL | Age: 82
End: 2017-07-12
Attending: INTERNAL MEDICINE
Payer: MEDICARE

## 2017-07-12 VITALS
BODY MASS INDEX: 20.87 KG/M2 | DIASTOLIC BLOOD PRESSURE: 67 MMHG | SYSTOLIC BLOOD PRESSURE: 126 MMHG | HEIGHT: 64 IN | HEART RATE: 104 BPM | OXYGEN SATURATION: 98 % | WEIGHT: 122.25 LBS | TEMPERATURE: 98 F

## 2017-07-12 DIAGNOSIS — D72.829 LEUKOCYTOSIS, UNSPECIFIED TYPE: Primary | ICD-10-CM

## 2017-07-12 DIAGNOSIS — D72.829 LEUKOCYTOSIS, UNSPECIFIED TYPE: ICD-10-CM

## 2017-07-12 LAB
BASOPHILS # BLD AUTO: 0.06 K/UL
BASOPHILS NFR BLD: 0.3 %
CLINICAL CYTOGENETICIST REVIEW: NORMAL
DACRYOCYTES BLD QL SMEAR: ABNORMAL
DIFFERENTIAL METHOD: ABNORMAL
EOSINOPHIL # BLD AUTO: 0.1 K/UL
EOSINOPHIL NFR BLD: 0.6 %
ERYTHROCYTE [DISTWIDTH] IN BLOOD BY AUTOMATED COUNT: 16.1 %
HCT VFR BLD AUTO: 33.2 %
HGB BLD-MCNC: 11 G/DL
LYMPHOCYTES # BLD AUTO: 3.5 K/UL
LYMPHOCYTES NFR BLD: 15.7 %
MBCR DISCLAIMER: NORMAL
MBCR REASON FOR REFERRAL: NORMAL
MBCR RELEASED BY: NORMAL
MBCR RESULT SUMMARY: NORMAL
MBCR RESULT TABLE: NORMAL
MBCR SPECIMEN: NORMAL
MCH RBC QN AUTO: 33.3 PG
MCHC RBC AUTO-ENTMCNC: 33.1 %
MCV RBC AUTO: 101 FL
MONOCYTES # BLD AUTO: 2 K/UL
MONOCYTES NFR BLD: 8.6 %
NEUTROPHILS # BLD AUTO: 16.9 K/UL
NEUTROPHILS NFR BLD: 74.8 %
PLATELET # BLD AUTO: 325 K/UL
PLATELET BLD QL SMEAR: ABNORMAL
PMV BLD AUTO: 10.3 FL
RBC # BLD AUTO: 3.3 M/UL
REF LAB TEST METHOD: NORMAL
SERVICE CMNT-IMP: NORMAL
SPECIMEN SOURCE: NORMAL
T(9;22)(ABL1,BCR) BLD/T FISH: NORMAL
WBC # BLD AUTO: 22.61 K/UL

## 2017-07-12 PROCEDURE — 99999 PR PBB SHADOW E&M-EST. PATIENT-LVL III: CPT | Mod: PBBFAC,,, | Performed by: INTERNAL MEDICINE

## 2017-07-12 PROCEDURE — 36415 COLL VENOUS BLD VENIPUNCTURE: CPT | Mod: PO

## 2017-07-12 PROCEDURE — 85025 COMPLETE CBC W/AUTO DIFF WBC: CPT | Mod: PO

## 2017-07-12 PROCEDURE — 99213 OFFICE O/P EST LOW 20 MIN: CPT | Mod: S$PBB,,, | Performed by: INTERNAL MEDICINE

## 2017-07-12 PROCEDURE — 1126F AMNT PAIN NOTED NONE PRSNT: CPT | Mod: ,,, | Performed by: INTERNAL MEDICINE

## 2017-07-12 PROCEDURE — 1159F MED LIST DOCD IN RCRD: CPT | Mod: ,,, | Performed by: INTERNAL MEDICINE

## 2017-07-12 RX ORDER — SERTRALINE HYDROCHLORIDE 50 MG/1
TABLET, FILM COATED ORAL
COMMUNITY
Start: 2017-04-25 | End: 2017-08-16

## 2017-07-12 NOTE — PROGRESS NOTES
Subjective:       Patient ID: Renee Gatica is a 89 y.o. female.    Chief Complaint: No chief complaint on file.    HPI This is a 89-year-old lady who has dementia.  She   comes today accompanied by her caretaker.  Her insight into the patient's   medical issues is limited.     The patient had a CBC done on 2017 that was reported as showing a   hemoglobin of 12 with an MCV of 100.  White blood cell count was 25,820 with 88%   granulocytes.  The patient has been on prednisone for at least four months for   a diagnosis of polymyalgia rheumatica.  It is noticed by going through the chart   and accessing the care elsewhere section of [facility] that she has had elevated   white cell count off and on between 10,000 and 15,000 for the last several   years.     She had a BRC-ABL gene test done 2 weeks ago and the report is still not in EPIC  She comes in accompanied by he caretaker which does not know what medications she is on.  ALLERGIES:  ACE inhibitors and sulfa.     MEDICATIONS:  See MedCard.     PAST MEDICAL HISTORY:  1.  Chronic kidney disease.  2.  Dementia.  3.  Diabetes mellitus type 2.  4.  Hypertension.  5.  Polymyalgia rheumatica.  6.  Mitral valve stenosis.  7.  Multinodular goiter.  8.  Osteopenia.     PAST SURGICAL HISTORY:  1.  Appendectomy, cardiac pacemaker placement.  2.   section.  3.  Cholecystectomy.  4.  Hysterectomy.     SOCIAL HISTORY:  She lives by herself with a care sitter who looks after her   during the day and the son-in-law during the evening.     She does not smoke or drink.     FAMILY DISEASES:  Unable to obtain.     REVIEW OF SYSTEMS:  Limited because of the lack of knowledge of the patient's   caretaker.  I told that she has a history of polymyalgia rheumatica, generalized   pain and dementia.  Review of Systems   Constitutional: Negative.    HENT: Negative.    Eyes: Negative.    Respiratory: Negative.  Negative for cough and wheezing.    Cardiovascular: Negative.  Negative for  chest pain.   Gastrointestinal: Negative.    Genitourinary: Negative.    Neurological: Negative.    Psychiatric/Behavioral: Negative.        Objective:      Physical Exam   Constitutional: She is oriented to person, place, and time. She appears well-developed. No distress.   HENT:   Head: Normocephalic.   Right Ear: Tympanic membrane and ear canal normal.   Left Ear: Tympanic membrane and ear canal normal.   Nose: Right sinus exhibits no maxillary sinus tenderness and no frontal sinus tenderness. Left sinus exhibits no maxillary sinus tenderness and no frontal sinus tenderness.   Mouth/Throat: Mucous membranes are normal.   Teeth normal.  Gums normal.   Eyes: Lids are normal. Pupils are equal, round, and reactive to light.   Neck: Normal carotid pulses, no hepatojugular reflux and no JVD present. Carotid bruit is not present. No tracheal deviation present. No thyroid mass and no thyromegaly present.   Cardiovascular: Normal rate, regular rhythm, S1 normal and S2 normal.  Exam reveals no gallop and no friction rub.    No murmur heard.  Carotid exam normal   Pulmonary/Chest: Effort normal and breath sounds normal. No accessory muscle usage. No respiratory distress. She has no wheezes. She has no rales. She exhibits no tenderness.   Abdominal: Soft. Normal appearance. She exhibits no distension and no mass. There is no splenomegaly or hepatomegaly. There is no tenderness. There is no rebound and no guarding.   Musculoskeletal: Normal range of motion. She exhibits no edema or tenderness.        Right hand: Normal.        Left hand: Normal.       Lymphadenopathy:     She has no cervical adenopathy.     She has no axillary adenopathy.        Right: No inguinal and no supraclavicular adenopathy present.        Left: No inguinal and no supraclavicular adenopathy present.   Neurological: She is alert and oriented to person, place, and time. She has normal strength. No cranial nerve deficit. Coordination normal.   Skin: Skin  is warm and dry. No rash noted. She is not diaphoretic. No cyanosis or erythema. No pallor. Nails show no clubbing.   Psychiatric: She has a normal mood and affect. Her behavior is normal. Judgment and thought content normal.     ..lstvitals      Assessment:       1. Leukocytosis, unspecified type        Plan:       She will have a cbc today. Will try to get the report of the BCR-ABL gene mutation test. Caretaker to bring her in 4 weeks with her home medicines

## 2017-07-25 RX ORDER — PREDNISONE 5 MG/1
TABLET ORAL
Qty: 90 TABLET | Refills: 0 | Status: SHIPPED | OUTPATIENT
Start: 2017-07-25 | End: 2017-08-13 | Stop reason: SDUPTHER

## 2017-07-25 NOTE — TELEPHONE ENCOUNTER
Please get sedimentation rate and C-reactive protein.  We may want to try to decrease the prednisone.

## 2017-08-01 ENCOUNTER — LAB VISIT (OUTPATIENT)
Dept: LAB | Facility: HOSPITAL | Age: 82
End: 2017-08-01
Attending: FAMILY MEDICINE
Payer: MEDICARE

## 2017-08-01 DIAGNOSIS — M35.3 PMR (POLYMYALGIA RHEUMATICA): ICD-10-CM

## 2017-08-01 LAB
CRP SERPL-MCNC: 14.5 MG/L
ERYTHROCYTE [SEDIMENTATION RATE] IN BLOOD BY WESTERGREN METHOD: 29 MM/HR

## 2017-08-01 PROCEDURE — 36415 COLL VENOUS BLD VENIPUNCTURE: CPT | Mod: PO

## 2017-08-01 PROCEDURE — 85651 RBC SED RATE NONAUTOMATED: CPT | Mod: PO

## 2017-08-01 PROCEDURE — 86140 C-REACTIVE PROTEIN: CPT

## 2017-08-08 ENCOUNTER — TELEPHONE (OUTPATIENT)
Dept: FAMILY MEDICINE | Facility: CLINIC | Age: 82
End: 2017-08-08

## 2017-08-08 DIAGNOSIS — M35.3 PMR (POLYMYALGIA RHEUMATICA): Primary | ICD-10-CM

## 2017-08-08 NOTE — TELEPHONE ENCOUNTER
Laboratory results are acceptable/stable.  Recommend decrease prednisone 10 mg daily and repeat ESR, CRP in 1 month. My nurse will contact you to arrange.   Thanks,   Dr. Ayers

## 2017-08-14 ENCOUNTER — LAB VISIT (OUTPATIENT)
Dept: LAB | Facility: HOSPITAL | Age: 82
End: 2017-08-14
Attending: INTERNAL MEDICINE
Payer: MEDICARE

## 2017-08-14 DIAGNOSIS — E11.22 DIABETES MELLITUS WITH STAGE 3 CHRONIC KIDNEY DISEASE: ICD-10-CM

## 2017-08-14 DIAGNOSIS — N18.30 DIABETES MELLITUS WITH STAGE 3 CHRONIC KIDNEY DISEASE: ICD-10-CM

## 2017-08-14 PROCEDURE — 36415 COLL VENOUS BLD VENIPUNCTURE: CPT | Mod: PO

## 2017-08-14 PROCEDURE — 83036 HEMOGLOBIN GLYCOSYLATED A1C: CPT

## 2017-08-14 RX ORDER — PREDNISONE 5 MG/1
10 TABLET ORAL DAILY
Qty: 60 TABLET | Refills: 0 | Status: SHIPPED | OUTPATIENT
Start: 2017-08-14 | End: 2017-08-29 | Stop reason: DRUGHIGH

## 2017-08-14 NOTE — TELEPHONE ENCOUNTER
Left message on voice mail to return call, CXR done on 3/1/17 was ok, is this good or is more recent one needed.  Does she need to see you for PPD or can it be scheduled as a NV, please advise.

## 2017-08-14 NOTE — TELEPHONE ENCOUNTER
----- Message from Tariq Chamorro sent at 8/14/2017  1:25 PM CDT -----  Contact: Yamile/Daughter  Caller called to say the nursing home are requiring pt to have a chest xray and a TB test and caller wants to bring pt in on Wednesday to have the TB test done since pt has a   Dr apt with another Dr that day, please contact caller at [phone]

## 2017-08-14 NOTE — TELEPHONE ENCOUNTER
----- Message from Sarah Santos sent at 8/14/2017  2:46 PM CDT -----  Contact: Pt/Daughter (Yamile)  Caller states that pt needs orders in for another xray that's due before the pt admittance and caller also states they are requesting a TB test. Please give Yamile a call at [phone].

## 2017-08-15 LAB
ESTIMATED AVG GLUCOSE: 146 MG/DL
HBA1C MFR BLD HPLC: 6.7 %

## 2017-08-16 ENCOUNTER — OFFICE VISIT (OUTPATIENT)
Dept: HEMATOLOGY/ONCOLOGY | Facility: CLINIC | Age: 82
End: 2017-08-16
Payer: MEDICARE

## 2017-08-16 ENCOUNTER — OFFICE VISIT (OUTPATIENT)
Dept: FAMILY MEDICINE | Facility: CLINIC | Age: 82
End: 2017-08-16
Payer: MEDICARE

## 2017-08-16 ENCOUNTER — HOSPITAL ENCOUNTER (OUTPATIENT)
Dept: RADIOLOGY | Facility: HOSPITAL | Age: 82
Discharge: HOME | End: 2017-08-16
Attending: FAMILY MEDICINE
Payer: MEDICARE

## 2017-08-16 VITALS
WEIGHT: 120.13 LBS | HEART RATE: 110 BPM | SYSTOLIC BLOOD PRESSURE: 132 MMHG | DIASTOLIC BLOOD PRESSURE: 71 MMHG | BODY MASS INDEX: 20.62 KG/M2

## 2017-08-16 DIAGNOSIS — M47.812 OSTEOARTHRITIS OF CERVICAL SPINE, UNSPECIFIED SPINAL OSTEOARTHRITIS COMPLICATION STATUS: ICD-10-CM

## 2017-08-16 DIAGNOSIS — D72.829 LEUKOCYTOSIS, UNSPECIFIED TYPE: ICD-10-CM

## 2017-08-16 DIAGNOSIS — M35.3 PMR (POLYMYALGIA RHEUMATICA): ICD-10-CM

## 2017-08-16 DIAGNOSIS — D64.9 ANEMIA, UNSPECIFIED TYPE: ICD-10-CM

## 2017-08-16 DIAGNOSIS — Z95.0 PRESENCE OF CARDIAC PACEMAKER: ICD-10-CM

## 2017-08-16 DIAGNOSIS — N18.30 DIABETES MELLITUS WITH STAGE 3 CHRONIC KIDNEY DISEASE: ICD-10-CM

## 2017-08-16 DIAGNOSIS — M35.3 PMR (POLYMYALGIA RHEUMATICA): Primary | ICD-10-CM

## 2017-08-16 DIAGNOSIS — F02.80 ALZHEIMER'S DEMENTIA WITHOUT BEHAVIORAL DISTURBANCE, UNSPECIFIED TIMING OF DEMENTIA ONSET: ICD-10-CM

## 2017-08-16 DIAGNOSIS — M85.80 OSTEOPENIA, UNSPECIFIED LOCATION: ICD-10-CM

## 2017-08-16 DIAGNOSIS — G30.9 ALZHEIMER'S DEMENTIA WITHOUT BEHAVIORAL DISTURBANCE, UNSPECIFIED TIMING OF DEMENTIA ONSET: ICD-10-CM

## 2017-08-16 DIAGNOSIS — I44.2 ACQUIRED COMPLETE AV BLOCK: ICD-10-CM

## 2017-08-16 DIAGNOSIS — N18.30 CHRONIC KIDNEY DISEASE (CKD), STAGE III (MODERATE): ICD-10-CM

## 2017-08-16 DIAGNOSIS — I08.0 MITRAL VALVE STENOSIS AND AORTIC VALVE STENOSIS: ICD-10-CM

## 2017-08-16 DIAGNOSIS — S40.022A ARM CONTUSION, LEFT, INITIAL ENCOUNTER: ICD-10-CM

## 2017-08-16 DIAGNOSIS — E04.2 MULTINODULAR GOITER: ICD-10-CM

## 2017-08-16 DIAGNOSIS — I15.2 HYPERTENSION ASSOCIATED WITH DIABETES: ICD-10-CM

## 2017-08-16 DIAGNOSIS — G30.9 ALZHEIMER'S DEMENTIA WITHOUT BEHAVIORAL DISTURBANCE, UNSPECIFIED TIMING OF DEMENTIA ONSET: Primary | ICD-10-CM
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
## 2017-08-16 DIAGNOSIS — F02.80 ALZHEIMER'S DEMENTIA WITHOUT BEHAVIORAL DISTURBANCE, UNSPECIFIED TIMING OF DEMENTIA ONSET: Primary | ICD-10-CM

## 2017-08-16 DIAGNOSIS — E11.59 HYPERTENSION ASSOCIATED WITH DIABETES: ICD-10-CM

## 2017-08-16 DIAGNOSIS — M48.061 LUMBAR STENOSIS: ICD-10-CM

## 2017-08-16 DIAGNOSIS — E11.22 DIABETES MELLITUS WITH STAGE 3 CHRONIC KIDNEY DISEASE: ICD-10-CM

## 2017-08-16 PROCEDURE — 73060 X-RAY EXAM OF HUMERUS: CPT | Mod: 26,LT,, | Performed by: RADIOLOGY

## 2017-08-16 PROCEDURE — 1159F MED LIST DOCD IN RCRD: CPT | Mod: ,,, | Performed by: INTERNAL MEDICINE

## 2017-08-16 PROCEDURE — 71020 XR CHEST PA AND LATERAL: CPT | Mod: TC,PO

## 2017-08-16 PROCEDURE — 99999 PR PBB SHADOW E&M-EST. PATIENT-LVL III: CPT | Mod: PBBFAC,,, | Performed by: FAMILY MEDICINE

## 2017-08-16 PROCEDURE — 86580 TB INTRADERMAL TEST: CPT | Mod: PBBFAC,PO

## 2017-08-16 PROCEDURE — 71020 XR CHEST PA AND LATERAL: CPT | Mod: 26,,, | Performed by: RADIOLOGY

## 2017-08-16 PROCEDURE — 99213 OFFICE O/P EST LOW 20 MIN: CPT | Mod: PBBFAC,25,PO | Performed by: FAMILY MEDICINE

## 2017-08-16 PROCEDURE — 99214 OFFICE O/P EST MOD 30 MIN: CPT | Mod: S$PBB,,, | Performed by: INTERNAL MEDICINE

## 2017-08-16 PROCEDURE — 1126F AMNT PAIN NOTED NONE PRSNT: CPT | Mod: ,,, | Performed by: FAMILY MEDICINE

## 2017-08-16 PROCEDURE — 1159F MED LIST DOCD IN RCRD: CPT | Mod: ,,, | Performed by: FAMILY MEDICINE

## 2017-08-16 PROCEDURE — 99214 OFFICE O/P EST MOD 30 MIN: CPT | Mod: S$PBB,,, | Performed by: FAMILY MEDICINE

## 2017-08-16 PROCEDURE — 73060 X-RAY EXAM OF HUMERUS: CPT | Mod: TC,PO,LT

## 2017-08-16 RX ORDER — BUTALBITAL, ACETAMINOPHEN AND CAFFEINE 50; 325; 40 MG/1; MG/1; MG/1
TABLET ORAL
Refills: 0 | COMMUNITY
Start: 2017-07-25 | End: 2017-08-24 | Stop reason: SDUPTHER

## 2017-08-16 NOTE — PROGRESS NOTES
Subjective:       Patient ID: Renee Gatica is a 89 y.o. female.    Chief Complaint: No chief complaint on file.    HPI  This is a 89-year-old lady who has dementia.  She   comes today accompanied by her caretaker.  Her insight into the patient's   medical issues is limited.     The patient had a CBC done on 2017 that was reported as showing a   hemoglobin of 12 with an MCV of 100.  White blood cell count was 25,820 with 88%   granulocytes.  The patient has been on prednisone for at least four months for   a diagnosis of polymyalgia rheumatica.  It is noticed by going through the chart   and accessing the care elsewhere section of [facility] that she has had elevated   white cell count off and on between 10,000 and 15,000 for the last several   years.     She had a BRC-ABL gene test which is negative  She comes in accompanied by her daughter which is visiting from [place]. She has sera taking prdnisone 15 mg a day and today should go down to  10 mg a day   ALLERGIES:  ACE inhibitors and sulfa.     MEDICATIONS:  See MedCard.     PAST MEDICAL HISTORY:  1.  Chronic kidney disease.  2.  Dementia.  3.  Diabetes mellitus type 2.  4.  Hypertension.  5.  Polymyalgia rheumatica.  6.  Mitral valve stenosis.  7.  Multinodular goiter.  8.  Osteopenia.     PAST SURGICAL HISTORY:  1.  Appendectomy, cardiac pacemaker placement.  2.   section.  3.  Cholecystectomy.  4.  Hysterectomy.     SOCIAL HISTORY:  She lives by herself with a care sitter who looks after her   during the day and the son-in-law during the evening.     She does not smoke or drink.     FAMILY DISEASES:  Unable to obtain.  Review of Systems   Constitutional: Negative.    HENT: Negative.    Eyes: Negative.    Respiratory: Negative.  Negative for cough and wheezing.    Cardiovascular: Negative.  Negative for chest pain.   Gastrointestinal: Negative.    Genitourinary: Negative.    Neurological: Negative.    Psychiatric/Behavioral: Negative.        Objective:       Physical Exam   Constitutional: She is oriented to person, place, and time. She appears well-developed. No distress.   HENT:   Head: Normocephalic.   Right Ear: Tympanic membrane, external ear and ear canal normal.   Left Ear: Tympanic membrane, external ear and ear canal normal.   Nose: Nose normal. Right sinus exhibits no maxillary sinus tenderness and no frontal sinus tenderness. Left sinus exhibits no maxillary sinus tenderness and no frontal sinus tenderness.   Mouth/Throat: Oropharynx is clear and moist and mucous membranes are normal.   Teeth normal.  Gums normal.   Eyes: Conjunctivae and lids are normal. Pupils are equal, round, and reactive to light.   Neck: Normal carotid pulses, no hepatojugular reflux and no JVD present. Carotid bruit is not present. No tracheal deviation present. No thyroid mass and no thyromegaly present.   Cardiovascular: Normal rate, regular rhythm, S1 normal, S2 normal, normal heart sounds and intact distal pulses.  Exam reveals no gallop and no friction rub.    No murmur heard.  Carotid exam normal   Pulmonary/Chest: Effort normal and breath sounds normal. No accessory muscle usage. No respiratory distress. She has no wheezes. She has no rales. She exhibits no tenderness.   Abdominal: Soft. Normal appearance. She exhibits no distension and no mass. There is no splenomegaly or hepatomegaly. There is no tenderness. There is no rebound and no guarding.   Musculoskeletal: Normal range of motion. She exhibits no edema or tenderness.        Right hand: Normal.        Left hand: Normal.       Lymphadenopathy:     She has no cervical adenopathy.     She has no axillary adenopathy.        Right: No inguinal and no supraclavicular adenopathy present.        Left: No inguinal and no supraclavicular adenopathy present.   Neurological: She is alert and oriented to person, place, and time. She has normal strength. No cranial nerve deficit. Coordination normal.   Skin: Skin is warm and dry.  No rash noted. She is not diaphoretic. No cyanosis or erythema. No pallor. Nails show no clubbing.   Psychiatric: She has a normal mood and affect. Her behavior is normal. Judgment and thought content normal.       Wt Readings from Last 3 Encounters:   07/12/17 55.4 kg (122 lb 3.9 oz)   06/14/17 54.8 kg (120 lb 13 oz)   05/31/17 54 kg (119 lb 0.8 oz)     Temp Readings from Last 3 Encounters:   07/12/17 97.7 °F (36.5 °C) (Oral)   06/14/17 97.6 °F (36.4 °C) (Oral)   03/14/17 97.6 °F (36.4 °C)     BP Readings from Last 3 Encounters:   07/12/17 126/67   06/14/17 115/67   05/31/17 110/71     Pulse Readings from Last 3 Encounters:   07/12/17 104   06/14/17 106   05/31/17 108       Assessment:       1. Alzheimer's dementia without behavioral disturbance, unspecified timing of dementia onset    2. Leukocytosis, unspecified type        Plan:       Lab Results   Component Value Date    WBC 22.61 (H) 07/12/2017    HGB 11.0 (L) 07/12/2017    HCT 33.2 (L) 07/12/2017     (H) 07/12/2017     07/12/2017     Sh has been on prednisone and that remains the most likely cause of the leukocytosis     See me in 3 months with a cbc

## 2017-08-16 NOTE — PROGRESS NOTES
Patient presents for follow-up paperwork for possible nursing home replacement.  She has  polymyalgia rheumatica currently stable with prednisone.   She is able to get up and down from a chair using walker.  She denies any current headache or jaw claudication.  She denies chest pain or shortness of breath.  She does have chronic kidney disease associated with type 2 diabetes which has been stable.  She did have some anemia associated with the PMR asymptomatic.  She does have some dementia.  She did have a fall recently and has bruise on her left arm.  Has mild discomfort    Lab Results   Component Value Date    HGBA1C 6.7 (H) 2017         Past Medical History:  Past Medical History:   Diagnosis Date    Acquired complete AV block 2015    Overview:  Required pacemaker placement.     Chronic kidney disease (CKD), stage III (moderate) 2015    CKD (chronic kidney disease) stage 3, GFR 30-59 ml/min     Dementia 2015    Diabetes mellitus type II     Hypertension     Lumbar stenosis     Mitral valve stenosis and aortic valve stenosis 2015    Overview:  EF 55-60%  Last Assessment & Plan:  Mild. No plans for further workup     Multinodular goiter 2015    Overview:  Has had normal TSH in      Osteopenia 2015    PMR (polymyalgia rheumatica) 2017    Presence of cardiac pacemaker 2015    Overview:  Placed for complete heart block  Last Assessment & Plan:  The patient's pacemaker site has healed nicely. She's not having any cardiac symptoms continue routine pacemaker checks     Spondylosis of cervical spine 2015    Overview:  C4-C7      Past Surgical History:   Procedure Laterality Date    APPENDECTOMY      CARDIAC PACEMAKER PLACEMENT       SECTION      CHOLECYSTECTOMY      HYSTERECTOMY       Social History     Social History    Marital status: Single     Spouse name: N/A    Number of children: N/A    Years of education: N/A     Occupational  History    Not on file.     Social History Main Topics    Smoking status: Never Smoker    Smokeless tobacco: Never Used    Alcohol use No    Drug use: No    Sexual activity: Not on file     Other Topics Concern    Not on file     Social History Narrative    No narrative on file     Family History   Problem Relation Age of Onset    Diabetes Daughter     Heart disease Brother     Colon cancer Brother     Cancer Father      Review of patient's allergies indicates:   Allergen Reactions    Ace inhibitors     Sulfa (sulfonamide antibiotics)      Current Outpatient Prescriptions on File Prior to Visit   Medication Sig Dispense Refill    butalbital-acetaminophen-caffeine -40 mg (FIORICET, ESGIC) -40 mg per tablet TK 1 T PO Q 6 H PRF PAIN  0    predniSONE (DELTASONE) 5 MG tablet Take 2 tablets (10 mg total) by mouth once daily. 60 tablet 0    [DISCONTINUED] sertraline (ZOLOFT) 50 MG tablet        No current facility-administered medications on file prior to visit.            ROS:  GENERAL: No fever, chills,  or significant weight changes.   CARDIOVASCULAR: Denies chest pain, PND, orthopnea .  ABDOMEN: Appetite fine. Denies diarrhea, abdominal pain, hematemesis or blood in stool.  URINARY: No flank pain, dysuria or hematuria.      OBJECTIVE:     Vitals:    08/16/17 1335   BP: 132/71   Pulse: 110   Weight: 54.5 kg (120 lb 2.4 oz)     Wt Readings from Last 3 Encounters:   08/16/17 54.5 kg (120 lb 2.4 oz)   07/12/17 55.4 kg (122 lb 3.9 oz)   06/14/17 54.8 kg (120 lb 13 oz)     APPEARANCE: Well nourished, well developed, in no acute distress.    HEAD: Normocephalic.  Atraumatic.  No sinus tenderness.  EYES:   Right eye: Pupil reactive.  Conjunctiva clear.    Left eye: Pupil reactive.  Conjunctiva clear.    . EOMI.    EARS: TM's intact. Light reflex normal. No retraction or perforation.    NOSE:  clear.  MOUTH & THROAT:  No pharyngeal erythema or exudate. No lesions.  NECK: Supple. No JVD.  No cervical  lymphadenopathy.  No thyromegaly.    CHEST: Breath sounds clear bilaterally.  Normal respiratory effort  CARDIOVASCULAR: Normal rate.  Regular rhythm.  2/6 systolic murmurs.  No rub.  No gallops.  ABDOMEN: Bowel sounds normal.  Soft.  No tenderness.  No organomegaly.  PERIPHERAL VASCULAR: No cyanosis.  No clubbing.  No edema.  NEUROLOGIC: No focal findings.  She is able to get up from a chair and walk using her walker without significant difficulty  Both shoulders with good range of motion without tenderness  MENTAL STATUS: Alert.  Oriented x 3.  She has a bruise at the left upper arm with mild tenderness.    Diagnoses and all orders for this visit:    PMR (polymyalgia rheumatica)  -     X-Ray Chest PA And Lateral; Future  -     POCT TB Skin Test    Alzheimer's dementia without behavioral disturbance, unspecified timing of dementia onset  -     X-Ray Chest PA And Lateral; Future  -     POCT TB Skin Test    Hypertension associated with diabetes    Mitral valve stenosis and aortic valve stenosis    Chronic kidney disease (CKD), stage III (moderate)    Multinodular goiter    Presence of cardiac pacemaker    Lumbar stenosis    Osteoarthritis of cervical spine, unspecified spinal osteoarthritis complication status    Acquired complete AV block    Anemia, unspecified type    Leukocytosis, unspecified type    Diabetes mellitus with stage 3 chronic kidney disease    Osteopenia, unspecified location    Fall, initial encounter    Arm contusion, left, initial encounter  -     X-Ray Humerus 2 View Left; Future      Paperwork completed.  Continue current medication.  Recent decrease of prednisone with follow-up laboratory scheduled for a month.  She will need repeat hemoglobin A1c in about 6 months.

## 2017-08-18 ENCOUNTER — CLINICAL SUPPORT (OUTPATIENT)
Dept: FAMILY MEDICINE | Facility: CLINIC | Age: 82
End: 2017-08-18
Payer: MEDICARE

## 2017-08-18 DIAGNOSIS — Z11.1 SCREENING-PULMONARY TB: Primary | ICD-10-CM

## 2017-08-18 LAB
TB INDURATION - 48 HR READ: NORMAL MM
TB INDURATION - 72 HR READ: NORMAL MM
TB SKIN TEST - 48 HR READ: NEGATIVE
TB SKIN TEST - 72 HR READ: NORMAL

## 2017-08-18 PROCEDURE — 99499 UNLISTED E&M SERVICE: CPT | Mod: S$PBB,,, | Performed by: FAMILY MEDICINE

## 2017-08-24 NOTE — TELEPHONE ENCOUNTER
----- Message from Alberta Jennifer sent at 8/24/2017  3:52 PM CDT -----  Contact: Yamile/daughter  Yamile called to request a refill.     1. What is the name of the medication you are requesting? Prednisone  /  Butalbital/Ace/Caff  2. What is the dose? 5 mg  /  Can't find the MG on the bottle of the 2nd prescription  3. How do you take the medication? Orally, topically, etc? Orally / orally  4. How often do you take this medication? Take 1 tablet 2 times daily / As needed for   headaches    5. Do you need a 30 day or 90 day supply? 30 day / she received 20 tablets  6. How many refills are you requesting? 1 / 1  7. What is your preferred pharmacy and location of the pharmacy?     [facility] [address] [place] [address] Luke Ramirez  [address] [place] [id number]  Phone: [phone] Fax: [phone]    8. Who can we contact with further questions? Yamile [phone]    Thanks,  Alberta

## 2017-08-25 RX ORDER — PREDNISONE 5 MG/1
10 TABLET ORAL DAILY
Qty: 60 TABLET | Refills: 0 | OUTPATIENT
Start: 2017-08-25

## 2017-08-25 RX ORDER — BUTALBITAL, ACETAMINOPHEN AND CAFFEINE 50; 325; 40 MG/1; MG/1; MG/1
TABLET ORAL
Qty: 20 TABLET | Refills: 0 | Status: SHIPPED | OUTPATIENT
Start: 2017-08-25

## 2017-08-25 NOTE — TELEPHONE ENCOUNTER
We just sent the prednisone a few days ago.  We can refill the Fioricet, but we may need to see her back regarding the headaches.  Sometimes people can get inflammation of the artery on the side of the head from the polymyalgia and this would require other treatment and biopsy.  Please have her come in for repeat sedimentation rate now and follow-up appointment.

## 2017-08-25 NOTE — TELEPHONE ENCOUNTER
----- Message from Alberta Rodriguez sent at 8/25/2017  2:21 PM CDT -----  Contact: Yamile Gonzalez/daughter  Yamile called to speak with the nurse; Gracie La is waiting for the order to admit her mother to the nursing home.     Also, the patient is having terrible headaches and the butalbital acetaminophen caffeine isn't working and she is wondering what else she can do.    She can be contacted at [phone].    Thanks,  Alberta

## 2017-08-25 NOTE — TELEPHONE ENCOUNTER
Left message on voice mail that fax received, once signed by MD we will fax back with all paperwork.

## 2017-08-25 NOTE — TELEPHONE ENCOUNTER
----- Message from Alexandra Shafer sent at 8/25/2017 11:11 AM CDT -----  Contact: nguyen jefferson-[phone]  Would like to consult with nurse regarding status of request for admit orders. Please call back at [phone].      Thanks,  Alexandra Shafer

## 2017-08-25 NOTE — TELEPHONE ENCOUNTER
Gracie La sent fax, , needs order to admit patient with diet and med list, see paper work in box, I printed all yellow highlighted papers

## 2017-08-28 ENCOUNTER — OFFICE VISIT (OUTPATIENT)
Dept: FAMILY MEDICINE | Facility: CLINIC | Age: 82
End: 2017-08-28
Payer: MEDICARE

## 2017-08-28 ENCOUNTER — LAB VISIT (OUTPATIENT)
Dept: LAB | Facility: HOSPITAL | Age: 82
End: 2017-08-28
Attending: FAMILY MEDICINE
Payer: MEDICARE

## 2017-08-28 ENCOUNTER — TELEPHONE (OUTPATIENT)
Dept: FAMILY MEDICINE | Facility: CLINIC | Age: 82
End: 2017-08-28

## 2017-08-28 VITALS
WEIGHT: 120 LBS | BODY MASS INDEX: 20.6 KG/M2 | DIASTOLIC BLOOD PRESSURE: 77 MMHG | HEART RATE: 100 BPM | SYSTOLIC BLOOD PRESSURE: 130 MMHG

## 2017-08-28 DIAGNOSIS — R51.9 NONINTRACTABLE HEADACHE, UNSPECIFIED CHRONICITY PATTERN, UNSPECIFIED HEADACHE TYPE: Primary | ICD-10-CM

## 2017-08-28 DIAGNOSIS — R51.9 WORSENING HEADACHES: Primary | ICD-10-CM

## 2017-08-28 DIAGNOSIS — M35.3 PMR (POLYMYALGIA RHEUMATICA): ICD-10-CM

## 2017-08-28 LAB
CRP SERPL-MCNC: 51 MG/L
ERYTHROCYTE [SEDIMENTATION RATE] IN BLOOD BY WESTERGREN METHOD: 62 MM/HR

## 2017-08-28 PROCEDURE — 1125F AMNT PAIN NOTED PAIN PRSNT: CPT | Mod: ,,, | Performed by: FAMILY MEDICINE

## 2017-08-28 PROCEDURE — 1159F MED LIST DOCD IN RCRD: CPT | Mod: ,,, | Performed by: FAMILY MEDICINE

## 2017-08-28 PROCEDURE — 99213 OFFICE O/P EST LOW 20 MIN: CPT | Mod: PBBFAC,PO | Performed by: FAMILY MEDICINE

## 2017-08-28 PROCEDURE — 99999 PR PBB SHADOW E&M-EST. PATIENT-LVL III: CPT | Mod: PBBFAC,,, | Performed by: FAMILY MEDICINE

## 2017-08-28 PROCEDURE — 99214 OFFICE O/P EST MOD 30 MIN: CPT | Mod: S$PBB,,, | Performed by: FAMILY MEDICINE

## 2017-08-28 RX ORDER — ASPIRIN 81 MG/1
81 TABLET ORAL DAILY
COMMUNITY

## 2017-08-28 RX ORDER — NORTRIPTYLINE HYDROCHLORIDE 10 MG/1
10 CAPSULE ORAL NIGHTLY
Qty: 30 CAPSULE | Refills: 2 | Status: SHIPPED | OUTPATIENT
Start: 2017-08-28

## 2017-08-28 NOTE — TELEPHONE ENCOUNTER
Check with the general surgeon to see if they do this, otherwise I was told that Dr. Salomon does these

## 2017-08-28 NOTE — TELEPHONE ENCOUNTER
----- Message from Terry Ayers MD sent at 8/28/2017  5:48 PM CDT -----  Inflammation test increasing again.  Recommend increase prednisone 40 mg a day starting now.  Please get her set up with rheumatology for further evaluation.  Also try to get her set up with general surgery regarding a temporal artery biopsy ASAP.  We have concern for possible giant cell arteritis in addition to her polymyalgia rheumatica.  Have her follow back with me in 2 weeks

## 2017-08-29 ENCOUNTER — TELEPHONE (OUTPATIENT)
Dept: FAMILY MEDICINE | Facility: CLINIC | Age: 82
End: 2017-08-29

## 2017-08-29 RX ORDER — PREDNISONE 20 MG/1
40 TABLET ORAL DAILY
Qty: 20 TABLET | Refills: 0 | Status: SHIPPED | OUTPATIENT
Start: 2017-08-29 | End: 2017-09-08

## 2017-08-29 NOTE — TELEPHONE ENCOUNTER
Daughter Yamile aware of prednisone increase, need to see rheumatology, number for Dr Carson given, informed referral approved.  Advised on temporal lobe biopsy, she will wait and see about that and CT scheduled Friday as patient going into [facility].

## 2017-08-29 NOTE — TELEPHONE ENCOUNTER
Have her take prednisone 40 mg daily.  Prescription sent to pharmacy.  Check CRP, sedimentation rate in 1 week and would make further dose adjustments at that time, probably would be able to decrease dosage to 20 mg depending on what the blood work looks like

## 2017-08-29 NOTE — TELEPHONE ENCOUNTER
I have heard back from CV surgeons nurse and she said they do it, should we just set up with them or wait to hear from general surgery, please advise.

## 2017-08-29 NOTE — TELEPHONE ENCOUNTER
----- Message from Nova Pang sent at 8/29/2017 11:31 AM CDT -----  Contact: Elan [facility]-[phone]    Would like to consult with the nurse about a new order for Rx medication Prednisone.  Please fax new order to [phone].  Please call back at [phone].  Thx-[facility]

## 2017-08-29 NOTE — TELEPHONE ENCOUNTER
----- Message from Ebonie Tapia sent at 8/29/2017  1:41 PM CDT -----  Contact: patient's daughter  Patient's daughter states that Gracie La has been trying to get instructions and dosage information for the patient's most recent prescriptions.    Dr. Ayers made some medication adjustments at the patient's last office visit.    Yamile (daughter) is requesting that this information be sent to Blanca La at (fax) [phone].    Her daughter can be reached at [phone].

## 2017-08-29 NOTE — PROGRESS NOTES
Patient presents with a complaint of some headaches.  Seems to have increased recently particular past couple weeks.  She's had some symptoms daily.  She feels like she's woken up with some symptoms.  She does have polymyalgia rheumatica and did have her prednisone dosage decreased recently.  Apparently daughter feels like she's had some chronic headaches prior to this, but not aware of any diagnosis of migraine.  She denies any vision changes.  No jaw claudication.  No fever or chills.    Past Medical History:  Past Medical History:   Diagnosis Date    Acquired complete AV block 2015    Overview:  Required pacemaker placement.     Chronic kidney disease (CKD), stage III (moderate) 2015    CKD (chronic kidney disease) stage 3, GFR 30-59 ml/min     Dementia 2015    Diabetes mellitus type II     Hypertension     Lumbar stenosis     Mitral valve stenosis and aortic valve stenosis 2015    Overview:  EF 55-60%  Last Assessment & Plan:  Mild. No plans for further workup     Multinodular goiter 2015    Overview:  Has had normal TSH in      Osteopenia 2015    PMR (polymyalgia rheumatica) 2017    Presence of cardiac pacemaker 2015    Overview:  Placed for complete heart block  Last Assessment & Plan:  The patient's pacemaker site has healed nicely. She's not having any cardiac symptoms continue routine pacemaker checks     Spondylosis of cervical spine 2015    Overview:  C4-C7      Past Surgical History:   Procedure Laterality Date    APPENDECTOMY      CARDIAC PACEMAKER PLACEMENT       SECTION      CHOLECYSTECTOMY      HYSTERECTOMY       Social History     Social History    Marital status: Single     Spouse name: N/A    Number of children: N/A    Years of education: N/A     Occupational History    Not on file.     Social History Main Topics    Smoking status: Never Smoker    Smokeless tobacco: Never Used    Alcohol use No    Drug use: No     Sexual activity: Not on file     Other Topics Concern    Not on file     Social History Narrative    No narrative on file     Family History   Problem Relation Age of Onset    Diabetes Daughter     Heart disease Brother     Colon cancer Brother     Cancer Father      Review of patient's allergies indicates:   Allergen Reactions    Zoloft [sertraline] Nausea And Vomiting    Ace inhibitors     Sulfa (sulfonamide antibiotics)      Current Outpatient Prescriptions on File Prior to Visit   Medication Sig Dispense Refill    butalbital-acetaminophen-caffeine -40 mg (FIORICET, ESGIC) -40 mg per tablet TK 1 T PO Q 6 H PRF PAIN 20 tablet 0    predniSONE (DELTASONE) 5 MG tablet Take 2 tablets (10 mg total) by mouth once daily. 60 tablet 0     No current facility-administered medications on file prior to visit.            ROS:  GENERAL: No fever, chills,  or significant weight changes.   CARDIOVASCULAR: Denies chest pain, PND.  ABDOMEN: Appetite fine. Denies diarrhea, abdominal pain, hematemesis or blood in stool.  URINARY: No flank pain, dysuria or hematuria.      OBJECTIVE:     Vitals:    08/28/17 1315   BP: 130/77   Pulse: 100   Weight: 54.4 kg (120 lb)     Wt Readings from Last 3 Encounters:   08/28/17 54.4 kg (120 lb)   08/16/17 54.5 kg (120 lb 2.4 oz)   07/12/17 55.4 kg (122 lb 3.9 oz)     APPEARANCE: Well nourished, well developed, in no acute distress.  Using wheelchair  HEAD: Normocephalic.  Atraumatic.  No sinus tenderness.  No tenderness over the temporal arteries  EYES:   Right eye: Pupil reactive.  Conjunctiva clear.    Left eye: Pupil reactive.  Conjunctiva clear.    Both fundi:  Grossly normal to nondilated exam. EOMI.    EARS: TM's intact. Light reflex normal. No retraction or perforation.    NOSE:  clear.  MOUTH & THROAT:  No pharyngeal erythema or exudate. No lesions.  NECK: Supple. No bruits.  No JVD.  No cervical lymphadenopathy.  No thyromegaly.    CHEST: Breath sounds clear  bilaterally.  Normal respiratory effort  CARDIOVASCULAR: Normal rate.  Regular rhythm.  No murmurs.  No rub.  No gallops.  ABDOMEN: Bowel sounds normal.  Soft.  No tenderness.  No organomegaly.  PERIPHERAL VASCULAR: No cyanosis.  No clubbing.  No edema.  NEUROLOGIC: No focal findings.  Moves all extremities equally.  She is using wheelchair today.  MENTAL STATUS: Alert.  Oriented x 3.    Diagnoses and all orders for this visit:    Nonintractable headache, unspecified chronicity pattern, unspecified headache type  -     Ambulatory referral to Neurology  -     CT Head Without Contrast; Future  -     CT Head Without Contrast    PMR (polymyalgia rheumatica)  -     Ambulatory referral to Neurology  -     CT Head Without Contrast; Future  -     CT Head Without Contrast    Other orders  -     nortriptyline (PAMELOR) 10 MG capsule; Take 1 capsule (10 mg total) by mouth every evening.     sedimentation rate and CRP pending.  Adjust prednisone dosing depending on what this looks like.  We need to consider evaluation for giant cell arteritis pending these results.  Otherwise plan follow-up 2 weeks

## 2017-09-01 ENCOUNTER — TELEPHONE (OUTPATIENT)
Dept: FAMILY MEDICINE | Facility: CLINIC | Age: 82
End: 2017-09-01

## 2017-09-01 NOTE — TELEPHONE ENCOUNTER
Per Yamile, patient in nursing home and she does not think the staff is too interested in getting her to any outside appointments.  Did not do CT today, declined scheduling biopsy.  Stressed importance of lab to monitor prednisone but daughter is back home and not sure if they will get her to have it done.  Please advise.

## 2017-09-01 NOTE — TELEPHONE ENCOUNTER
----- Message from Kaylan Mittal sent at 9/1/2017  3:53 PM CDT -----  Contact: Pt daughter Yamile Gonzalez  Pt daughter Yamile Gonzalez calling to inform office that she may not be in for her appt, she is in a assisted living / home and they are just unsure right now,, please call Pt daughter Yamile Gonzalez at [phone]

## 2017-09-01 NOTE — TELEPHONE ENCOUNTER
If she is in the nursing home then she needs to see one of the doctors that goes to the nursing home and they can take care of this for her.  If she is in assisted living then she will need to at least get her blood work done that we have scheduled so that we can adjust medication.  We can try to set up home health for this if needed.

## 2017-09-05 NOTE — TELEPHONE ENCOUNTER
[facility] fax[facility]

## 2017-09-25 ENCOUNTER — TELEPHONE (OUTPATIENT)
Dept: FAMILY MEDICINE | Facility: CLINIC | Age: 82
End: 2017-09-25

## 2017-09-25 NOTE — TELEPHONE ENCOUNTER
The CT scan of the head looks okay.    CT Head WO Contrast9/18/2017  [facility]  Result Impression    1.  No acute intracranial abnormality.    2. Mild periventricular white matter changes, compatible with chronic microvascular ischemic disease           Electronically signed by Clarke Reyes MD on 9/18/2017 2:41 PM   Result Narrative   REASON FOR EXAM: [R51]-Headache / [M35.3]-Polymyalgia rheumatica      TECHNICAL FACTORS: 5 mm contiguous axial CT images were obtained from the foramen magnum to the skull vertex.     COMPARISON: March 12, 2016    FINDINGS: There is no acute intracranial hemorrhage, masses, or mass effect. Ventricles normal in size. There is mild periventricular white matter changes, compatible with chronic microvascular ischemic disease. There is no midline shift. Basal cisterns   are patent. There are no intra or extra-axial fluid collections identified.    The orbits are symmetric. There is mucosal thickening of the left maxillary sinus. Remaining paranasal sinuses and mastoid air cells are well aerated. There is mild hyperostosis frontalis internus.

## 2017-09-25 NOTE — TELEPHONE ENCOUNTER
----- Message from Aster Daly sent at 9/25/2017  4:19 PM CDT -----  Contact: Yamile  Returning your call.,please call Yamile @ [phone]. Thanks, petra

## 2017-09-26 ENCOUNTER — TELEPHONE (OUTPATIENT)
Dept: FAMILY MEDICINE | Facility: CLINIC | Age: 82
End: 2017-09-26

## 2018-01-09 ENCOUNTER — TELEPHONE (OUTPATIENT)
Dept: FAMILY MEDICINE | Facility: CLINIC | Age: 83
End: 2018-01-09

## 2018-01-09 ENCOUNTER — TELEPHONE (OUTPATIENT)
Dept: HEMATOLOGY/ONCOLOGY | Facility: CLINIC | Age: 83
End: 2018-01-09

## 2018-01-09 NOTE — TELEPHONE ENCOUNTER
----- Message from Nova Pang sent at 1/9/2018  8:44 AM CST -----  Contact: [facility] -[phone]  Would like to consult with the nurse about labs on patient.  Please call back at [phone]. Thx-AH

## 2018-01-09 NOTE — TELEPHONE ENCOUNTER
Patient in nursing home and they report she has a PCP there and will no longer see Dr Ayers as she can't have 2 PCPs.  Lab appointment cancelled.

## 2018-01-09 NOTE — TELEPHONE ENCOUNTER
----- Message from Nova Pang sent at 1/9/2018  8:44 AM CST -----  Contact: [facility] -[phone]  Would like to consult with the nurse about labs on patient.  Please call back at [phone]. Thx-AH

## 2018-01-09 NOTE — TELEPHONE ENCOUNTER
I spoke with Kathy at [facility].  Patient will get her cbc prior to her appointment in April with Dr Hernandez.  They will bring results to the appointment.

## 2022-03-05 NOTE — TELEPHONE ENCOUNTER
----- Message from Jaclyn Reynolds sent at 4/4/2017  9:01 AM CDT -----  Contact: [facility]  States need to speak back with Helga. Please adv/call [phone].//thanks. cw  
----- Message from Maggie Hills sent at 4/5/2017 12:38 PM CDT -----  Contact: pepito/daughter [phone]  States that she is calling to check status on rx for prednisone. Please call back at [phone]//thank you acc  
Lab order clarified  
Left message on voice mail that medication sent to [facility] in [place], to continue, and HH will be notified to repeat lab in 4 weeks  
0

## 2022-03-10 NOTE — TELEPHONE ENCOUNTER
Daughter Yamile julia, will check with [facility] in am [phone]   Do not take toradol while you are taking meloxicam.